# Patient Record
Sex: FEMALE | Race: BLACK OR AFRICAN AMERICAN | NOT HISPANIC OR LATINO | Employment: OTHER | URBAN - METROPOLITAN AREA
[De-identification: names, ages, dates, MRNs, and addresses within clinical notes are randomized per-mention and may not be internally consistent; named-entity substitution may affect disease eponyms.]

---

## 2023-04-07 ENCOUNTER — NURSING HOME VISIT (OUTPATIENT)
Dept: POST ACUTE CARE | Facility: EXTERNAL LOCATION | Age: 78
End: 2023-04-07
Payer: COMMERCIAL

## 2023-04-07 DIAGNOSIS — F02.80 ALZHEIMER'S DEMENTIA, UNSPECIFIED DEMENTIA SEVERITY, UNSPECIFIED TIMING OF DEMENTIA ONSET, UNSPECIFIED WHETHER BEHAVIORAL, PSYCHOTIC, OR MOOD DISTURBANCE OR ANXIETY (MULTI): ICD-10-CM

## 2023-04-07 DIAGNOSIS — K21.9 GASTROESOPHAGEAL REFLUX DISEASE WITHOUT ESOPHAGITIS: ICD-10-CM

## 2023-04-07 DIAGNOSIS — F41.9 ANXIETY AND DEPRESSION: ICD-10-CM

## 2023-04-07 DIAGNOSIS — L30.9 DERMATITIS: Primary | ICD-10-CM

## 2023-04-07 DIAGNOSIS — R53.81 PHYSICAL DEBILITY: ICD-10-CM

## 2023-04-07 DIAGNOSIS — F32.A ANXIETY AND DEPRESSION: ICD-10-CM

## 2023-04-07 DIAGNOSIS — M47.812 CHRONIC NECK PAIN WITH OSTEOARTHRITIS DETERMINED BY X-RAY: ICD-10-CM

## 2023-04-07 DIAGNOSIS — D64.9 ANEMIA, UNSPECIFIED TYPE: ICD-10-CM

## 2023-04-07 DIAGNOSIS — Z86.718 HISTORY OF DEEP VENOUS THROMBOSIS (DVT) OF DISTAL VEIN OF LEFT LOWER EXTREMITY: ICD-10-CM

## 2023-04-07 DIAGNOSIS — G30.9 ALZHEIMER'S DEMENTIA, UNSPECIFIED DEMENTIA SEVERITY, UNSPECIFIED TIMING OF DEMENTIA ONSET, UNSPECIFIED WHETHER BEHAVIORAL, PSYCHOTIC, OR MOOD DISTURBANCE OR ANXIETY (MULTI): ICD-10-CM

## 2023-04-07 DIAGNOSIS — R13.10 DYSPHAGIA, UNSPECIFIED TYPE: ICD-10-CM

## 2023-04-07 DIAGNOSIS — E46 PROTEIN-CALORIE MALNUTRITION, UNSPECIFIED SEVERITY (MULTI): ICD-10-CM

## 2023-04-07 DIAGNOSIS — G89.29 CHRONIC NECK PAIN WITH OSTEOARTHRITIS DETERMINED BY X-RAY: ICD-10-CM

## 2023-04-07 DIAGNOSIS — E78.5 HYPERLIPIDEMIA, UNSPECIFIED HYPERLIPIDEMIA TYPE: ICD-10-CM

## 2023-04-07 DIAGNOSIS — I10 PRIMARY HYPERTENSION: ICD-10-CM

## 2023-04-07 DIAGNOSIS — G62.9 NEUROPATHY: ICD-10-CM

## 2023-04-07 PROCEDURE — 99309 SBSQ NF CARE MODERATE MDM 30: CPT | Performed by: NURSE PRACTITIONER

## 2023-04-07 NOTE — LETTER
Patient: Elida Perry  : 1945    Encounter Date: 2023    Late entry;   Subjective  Patient ID: Elida Perry is a 77 y.o. female who is long term resident being seen and evaluated for multiple medical problems.    HPI   A 78 y/o AA female who is a LTC resident at [facility]. Resident with medical history of HTN, HLD, Senile Dementia with behavioral disturbance, Depression, Anxiety, Bell's palsy, Anemia, Osteoarthritis of B/L shoulders, PVD, and Protein Calorie Malnutrition. Resident calm and pleasant. Care giver reported resident is itching on the back of her back of the head. No acute distress. Tolerate TF well. Denies SOB, dyspnea, F/C/S/N/V, constipation per care giver reported. Unable to participate meaningfully in ROS secondary to dementia.     Past Medical History  · Alzheimer's type dementia (331.0,294.10) (G30.9,F02.80)  · Anemia (285.9) (D64.9)  · Anxiety (300.00) (F41.9)  · Arthritis (716.90) (M19.90)  · Atopic dermatitis (691.8) (L20.9)  · Bell's palsy (351.0) (G51.0)  · Benign essential hypertension (401.1) (I10)  · Bilateral hearing loss (389.9) (H91.93)  · Bilateral shoulder pain, unspecified chronicity (719.41) (M25.511,M25.512)  · Chronic GERD (530.81) (K21.9)  · Chronic pain (338.29) (G89.29)  · Encounter for screening for osteoporosis (V82.81) (Z13.820)  · Esophageal reflux (530.81) (K21.9)  · Fibrocystic breast disease (610.1) (N60.19)  · Hypercholesterolemia (272.0) (E78.00)  · Insomnia (780.52) (G47.00)  · Lower back pain (724.2) (M54.50)  · Osteoarthritis of knee (715.36) (M17.9)  · Osteoarthritis of left shoulder (715.91) (M19.012)  · Osteoarthritis of right shoulder (715.91) (M19.011)  · Osteoporosis (733.00) (M81.0)  · Patellar tendonitis (726.64) (M76.50)  · Protein-calorie malnutrition, mild (263.1) (E44.1)  · PVD (peripheral vascular disease) (443.9) (I73.9)  · Rotator cuff arthropathy (716.81) (M12.819)  · S/P TKR (total knee replacement) using cement, left  (V43.65) (Z96.652)  · Urge and stress incontinence (788.33) (N39.46)  · Urinary frequency (788.41) (R35.0)  Surgical History   · History of Hand Surgery   · History of Hysterectomy   · History of Percutaneous endoscopic gastrostomy tube insertion   · History of Throat Surgery   · History of Tonsillectomy   · History of Total Knee Arthroplasty   · History of Wrist Surgery    Family History  Mother    · Family history of Reported Family History Of Heart Disease  Father    · Family history of Reported Family History Of Heart Disease  Brother    · Family history of Reported Family History Of Cancer  Family History    · Family history of Reported Family History Of Cancer  Social History  Problems    · Caregiver   · Completed elementary school   · Completed high school   ·    · Employed in sales position   · Marital History - Single   · Never Drank Alcohol   · Never smoker   · Never smoker   · Not currently employed   · Occupation: Retired   ·   Allergies  Medication    · No Known Drug Allergies   Recorded By: Ena Roca; 7/10/2013 8:49:49 AM  Review of Systems  Limited of ROS due to dementia   Objective  3/24/2023 14:51 111 / 73 mmHg Sitting r/arm  3/24/2023 14:51 97.3 °F Forehead (non-contact)  3/24/2023 14:51 52 bpm Regular  3/24/2023 14:51 18 Breaths/min  3/24/2023 14:51 98.0 % Room Air  Physical Exam  Constitutional: awake, afebrile, not in acute distress  Eyes: clear sclera  ENMT: mucous membranes moist  Respiratory/Thorax: CTAB, no rales/rhonchi/wheezing  Cardiovascular: RRR, no rubs/murmurs/gallops  Gastrointestinal: soft, nt/nd/ng/nr. PEG in place  Extremities: no edema, no cellulitis  Neurological: Awake and alert at her baseline/dementia   Psychological: Appropriate mood and behavior  Assessment/Plan  #Dermatitis  - Started on steroid cream   #HTN  - c/w Norvasc 5 mg Peg daily as ordered continue to monitor vitals  - continue monitor renal function  #HLD  - c/w Lipitor 40 mg PEG daily as  ordered  - Lipid panel yearly   - monitor CMP every 6 months   # Alzheimer type dementia   - C/W memantine 10 mg oral tablet - 1 tab(s) orally 2 times a day  - C/W donepezil 10 mg oral tablet - 1 tab(s) orally once a day (at bedtime)  #Depression, Anxiety  - c/w sertraline 125 mg oral tablet - 1 tab(s) PEG once a day  - c/w traZODone 50 mg oral tablet - 1.5 tab(s) PEG once a day (at bedtime)  #Protein Calorie Malnutrition.   #Dysphagia: PEG  - c/w Enteral Feed and water flushes for supplement   - c/w Regular diet, Pureed texture, Thin consistency  #Physical debility  - Need assistance  - Fall precaution   #Chronic C-diff: c/w vanco and Probiotic capsule as ordered  #hx LLE DVT: c/w Eliquis as ordered  #Anemia: stable.   - c/w ferrous sulfate and Folic Acid as ordered. continue to monitor labs  #Neuropathy/Osteoarthritis Pain: c/w Gabapentin and Tramadol as ordered  #Diffuse Obstructive Pulmonary Syndrome: stable. c/w ProAir, Singulair, and Claritin as ordered  #GERD: c/w Protonix   #Constipation: c/w Dulcolax and Mag Hydroxide as ordered      Electronically Signed By: YOSSI Barney-CNP   5/7/23 12:01 AM

## 2023-05-06 PROBLEM — G89.29 CHRONIC NECK PAIN WITH OSTEOARTHRITIS DETERMINED BY X-RAY: Status: ACTIVE | Noted: 2023-05-06

## 2023-05-06 PROBLEM — R13.10 DYSPHAGIA: Status: ACTIVE | Noted: 2023-05-06

## 2023-05-06 PROBLEM — G62.9 NEUROPATHY: Status: ACTIVE | Noted: 2023-05-06

## 2023-05-06 PROBLEM — M47.812 CHRONIC NECK PAIN WITH OSTEOARTHRITIS DETERMINED BY X-RAY: Status: ACTIVE | Noted: 2023-05-06

## 2023-05-06 PROBLEM — E78.5 HYPERLIPIDEMIA: Status: ACTIVE | Noted: 2023-05-06

## 2023-05-06 PROBLEM — E46 PROTEIN-CALORIE MALNUTRITION (MULTI): Status: ACTIVE | Noted: 2023-05-06

## 2023-05-06 PROBLEM — D64.9 ANEMIA: Status: ACTIVE | Noted: 2023-05-06

## 2023-05-06 PROBLEM — G30.9 ALZHEIMER'S DEMENTIA (MULTI): Status: ACTIVE | Noted: 2023-05-06

## 2023-05-06 PROBLEM — I10 PRIMARY HYPERTENSION: Status: ACTIVE | Noted: 2023-05-06

## 2023-05-06 PROBLEM — F32.A ANXIETY AND DEPRESSION: Status: ACTIVE | Noted: 2023-05-06

## 2023-05-06 PROBLEM — F02.80 ALZHEIMER'S DEMENTIA (MULTI): Status: ACTIVE | Noted: 2023-05-06

## 2023-05-06 PROBLEM — F41.9 ANXIETY AND DEPRESSION: Status: ACTIVE | Noted: 2023-05-06

## 2023-05-06 PROBLEM — Z86.718 HISTORY OF DEEP VENOUS THROMBOSIS (DVT) OF DISTAL VEIN OF LEFT LOWER EXTREMITY: Status: ACTIVE | Noted: 2023-05-06

## 2023-05-06 PROBLEM — R53.81 PHYSICAL DEBILITY: Status: ACTIVE | Noted: 2023-05-06

## 2023-05-06 PROBLEM — L30.9 DERMATITIS: Status: ACTIVE | Noted: 2023-05-06

## 2023-05-07 NOTE — PROGRESS NOTES
Subjective   Patient ID: Elida Perry is a 77 y.o. female who is long term resident being seen and evaluated for multiple medical problems. Monthly visit   HPI   A 76 y/o AA female who is a LTC resident at [facility]. Resident with medical history of HTN, HLD, Senile Dementia with behavioral disturbance, Depression, Anxiety, Bell's palsy, Anemia, Osteoarthritis of B/L shoulders, PVD, and Protein Calorie Malnutrition. Resident calm and pleasant. No acute distress. Tolerate TF well. Denies SOB, dyspnea, F/C/S/N/V, constipation per care giver reported. Unable to participate meaningfully in ROS secondary to dementia.   Review of Systems  Limited of ROS due to dementia   Objective     Lab result 5/4/23  Hemoglobin A1c 5.5 % 4.3-5.6  Final  Comp Metabolic Panel       Protein, Total  6.5 g/dL 6.3-8.0  Final           Albumin  3.1 g/dL 3.9-4.9 L Final           Calcium, Total  9.2 mg/dL 8.5-10.2  Final           Bilirubin, Total  0.2 mg/dL 0.2-1.3  Final           Alkaline Phosphatase  333 U/L  H Final           AST  27 U/L 13-35  Final           ALT  22 U/L 7-38  Final           Glucose  87 mg/dL 74-99  Final      The American Diabetes Association (ADA) provides guidance for cutoff values for fasting glucose and random glucose. The ADA defines fasting as no caloric intake for at least 8 hours. Fasting plasma glucose results between 100 to 125 mg/dL indicate increased risk for diabetes (prediabetes).  Fasting plasma glucose results greater than or equal to 126 mg/dL meet the criteria for diagnosis of diabetes. In the absence of unequivocal hyperglycemia, results should be confirmed by repeat testing. In a patient with classic symptoms of hyperglycemia or hyperglycemic crisis, random plasma glucose results greater than or equal to 200 mg/dL meet the criteria for diagnosis of diabetes.  Reference: Standards of Medical Care in Diabetes 2016, American Diabetes Association. Diabetes Care. 2016.39(Suppl 1).        BUN  10 mg/dL 7-21  Final           Creatinine  0.72 mg/dL 0.58-0.96  Final           Sodium  138 mmol/L 136-144  Final           Potassium  3.6 mmol/L 3.7-5.1 L Final           Chloride  104 mmol/L   Final           CO2  28 mmol/L 22-30  Final           Anion Gap  6 mmol/L 9-18 L Final           Estimated Glomerular Filtration Rate  87 mL/min/1.73 meters squared >=60  Final    CBC and Differential       White Blood Cell Count  5.18 k/uL 3.70-11.00  Final           RBC  3.56 m/uL 3.90-5.20 L Final           Hemoglobin  10.3 g/dL 11.5-15.5 L Final           Hematocrit  32.0 % 36.0-46.0 L Final           MCV  89.9 fL 80.0-100.0  Final           MCH  28.9 pg 26.0-34.0  Final           MCHC  32.2 g/dL 30.5-36.0  Final           RDW-CV  13.4 % 11.5-15.0  Final           Platelet Count  131 k/uL 150-400 L Final      Results checked and verified.No clot detected.       MPV  12.0 fL 9.0-12.7  Final           Neut%  48.3 %   Final           Abs Neut  2.50 k/uL 1.45-7.50  Final           Lymph%  36.9 %   Final           Abs Lymph  1.91 k/uL 1.00-4.00  Final           Mono%  10.2 %   Final           Abs Mono  0.53 k/uL <0.87  Final           Eosin%  4.2 %   Final           Abs Eosin  0.22 k/uL <0.46  Final           Baso%  0.2 %   Final           Abs Baso  <0.03 k/uL <0.11  Final           Immature Gran %%  0.2 %   Final           Abs Immature Gran  <0.03 k/uL <0.10  Final           Absolute nRBC  0.0 /100 WBC   Final           Absolute nRBC  <0.01 k/uL <0.01  Final           Diff Type  Auto    Final      This is an appended report.  These results have been appended to a previously  verified report.   Physical Exam  Constitutional: awake, afebrile, not in acute distress  Eyes: clear sclera  ENMT: mucous membranes moist  Respiratory/Thorax: CTAB, no rales/rhonchi/wheezing  Cardiovascular: RRR, no rubs/murmurs/gallops  Gastrointestinal: soft, nt/nd/ng/nr. PEG in place  Extremities: no edema, no  cellulitis  Neurological: Awake and alert at her baseline/dementia   Psychological: Appropriate mood and behavior  Assessment/Plan   #Anemia: stable.   - c/w ferrous sulfate and Folic Acid as ordered. continue to monitor labs  #HTN  - c/w Norvasc 5 mg Peg daily as ordered continue to monitor vitals  - continue monitor renal function  #HLD  - c/w Lipitor 40 mg PEG daily as ordered  - Lipid panel yearly on 12/13/2023  - monitor CMP every 6 months   # Alzheimer type dementia   - C/W memantine 10 mg oral tablet - 1 tab(s) orally 2 times a day  - C/W donepezil 10 mg oral tablet - 1 tab(s) orally once a day (at bedtime)    Chronic medical problem   #Depression, Anxiety  - c/w sertraline 125 mg oral tablet - 1 tab(s) PEG once a day  - c/w traZODone 50 mg oral tablet - 1.5 tab(s) PEG once a day (at bedtime)  #Protein Calorie Malnutrition.   #Dysphagia: PEG  - c/w Enteral Feed and water flushes for supplement   - c/w Regular diet, Pureed texture, Thin consistency  #Physical debility  - Need assistance  - Fall precaution   #Chronic C-diff: c/w vanco and Probiotic capsule as ordered  #hx LLE DVT: c/w Eliquis as ordered  #Neuropathy/Osteoarthritis Pain: c/w Gabapentin and Tramadol as ordered  #Diffuse Obstructive Pulmonary Syndrome: stable. c/w ProAir, Singulair, and Claritin as ordered  #GERD: c/w Protonix   #Constipation: c/w Dulcolax and Mag Hydroxide as ordered    Time spending for 35 minutes   -reviewed of hospital record via EMR and reconciled medication in PCC and EMR (d/c summary). Reviewed orders, medications, records, and pertinent labs/x-rays from PCC. Reviewed VS, BS, O2, etc as per protocol. Reviewed and signed off orders, medications, Labs, x-rays, and current diagnoses in PCC  -Obtained history  -Performing physical examination  -Ordering medications, lab   -Documenting clinical information in the [facility]   -Care coordinating with nursing staff

## 2023-05-07 NOTE — PROGRESS NOTES
Late entry;   Subjective   Patient ID: Elida Perry is a 77 y.o. female who is long term resident being seen and evaluated for multiple medical problems.    HPI   A 78 y/o AA female who is a LTC resident at [facility]. Resident with medical history of HTN, HLD, Senile Dementia with behavioral disturbance, Depression, Anxiety, Bell's palsy, Anemia, Osteoarthritis of B/L shoulders, PVD, and Protein Calorie Malnutrition. Resident calm and pleasant. Care giver reported resident is itching on the back of her back of the head. No acute distress. Tolerate TF well. Denies SOB, dyspnea, F/C/S/N/V, constipation per care giver reported. Unable to participate meaningfully in ROS secondary to dementia.     Past Medical History  · Alzheimer's type dementia (331.0,294.10) (G30.9,F02.80)  · Anemia (285.9) (D64.9)  · Anxiety (300.00) (F41.9)  · Arthritis (716.90) (M19.90)  · Atopic dermatitis (691.8) (L20.9)  · Bell's palsy (351.0) (G51.0)  · Benign essential hypertension (401.1) (I10)  · Bilateral hearing loss (389.9) (H91.93)  · Bilateral shoulder pain, unspecified chronicity (719.41) (M25.511,M25.512)  · Chronic GERD (530.81) (K21.9)  · Chronic pain (338.29) (G89.29)  · Encounter for screening for osteoporosis (V82.81) (Z13.820)  · Esophageal reflux (530.81) (K21.9)  · Fibrocystic breast disease (610.1) (N60.19)  · Hypercholesterolemia (272.0) (E78.00)  · Insomnia (780.52) (G47.00)  · Lower back pain (724.2) (M54.50)  · Osteoarthritis of knee (715.36) (M17.9)  · Osteoarthritis of left shoulder (715.91) (M19.012)  · Osteoarthritis of right shoulder (715.91) (M19.011)  · Osteoporosis (733.00) (M81.0)  · Patellar tendonitis (726.64) (M76.50)  · Protein-calorie malnutrition, mild (263.1) (E44.1)  · PVD (peripheral vascular disease) (443.9) (I73.9)  · Rotator cuff arthropathy (716.81) (M12.819)  · S/P TKR (total knee replacement) using cement, left (V43.65) (Z96.652)  · Urge and stress incontinence (788.33) (N39.46)  ·  Urinary frequency (788.41) (R35.0)  Surgical History   · History of Hand Surgery   · History of Hysterectomy   · History of Percutaneous endoscopic gastrostomy tube insertion   · History of Throat Surgery   · History of Tonsillectomy   · History of Total Knee Arthroplasty   · History of Wrist Surgery    Family History  Mother    · Family history of Reported Family History Of Heart Disease  Father    · Family history of Reported Family History Of Heart Disease  Brother    · Family history of Reported Family History Of Cancer  Family History    · Family history of Reported Family History Of Cancer  Social History  Problems    · Caregiver   · Completed elementary school   · Completed high school   ·    · Employed in sales position   · Marital History - Single   · Never Drank Alcohol   · Never smoker   · Never smoker   · Not currently employed   · Occupation: Retired   ·   Allergies  Medication    · No Known Drug Allergies   Recorded By: Ena Roca; 7/10/2013 8:49:49 AM  Review of Systems  Limited of ROS due to dementia   Objective   3/24/2023 14:51 111 / 73 mmHg Sitting r/arm  3/24/2023 14:51 97.3 °F Forehead (non-contact)  3/24/2023 14:51 52 bpm Regular  3/24/2023 14:51 18 Breaths/min  3/24/2023 14:51 98.0 % Room Air  Physical Exam  Constitutional: awake, afebrile, not in acute distress  Eyes: clear sclera  ENMT: mucous membranes moist  Respiratory/Thorax: CTAB, no rales/rhonchi/wheezing  Cardiovascular: RRR, no rubs/murmurs/gallops  Gastrointestinal: soft, nt/nd/ng/nr. PEG in place  Extremities: no edema, no cellulitis  Neurological: Awake and alert at her baseline/dementia   Psychological: Appropriate mood and behavior  Assessment/Plan   #Dermatitis  - Started on steroid cream   #HTN  - c/w Norvasc 5 mg Peg daily as ordered continue to monitor vitals  - continue monitor renal function  #HLD  - c/w Lipitor 40 mg PEG daily as ordered  - Lipid panel yearly   - monitor CMP every 6 months   # Skip  type dementia   - C/W memantine 10 mg oral tablet - 1 tab(s) orally 2 times a day  - C/W donepezil 10 mg oral tablet - 1 tab(s) orally once a day (at bedtime)  #Depression, Anxiety  - c/w sertraline 125 mg oral tablet - 1 tab(s) PEG once a day  - c/w traZODone 50 mg oral tablet - 1.5 tab(s) PEG once a day (at bedtime)  #Protein Calorie Malnutrition.   #Dysphagia: PEG  - c/w Enteral Feed and water flushes for supplement   - c/w Regular diet, Pureed texture, Thin consistency  #Physical debility  - Need assistance  - Fall precaution   #Chronic C-diff: c/w vanco and Probiotic capsule as ordered  #hx LLE DVT: c/w Eliquis as ordered  #Anemia: stable.   - c/w ferrous sulfate and Folic Acid as ordered. continue to monitor labs  #Neuropathy/Osteoarthritis Pain: c/w Gabapentin and Tramadol as ordered  #Diffuse Obstructive Pulmonary Syndrome: stable. c/w ProAir, Singulair, and Claritin as ordered  #GERD: c/w Protonix   #Constipation: c/w Dulcolax and Mag Hydroxide as ordered

## 2023-05-08 ENCOUNTER — NURSING HOME VISIT (OUTPATIENT)
Dept: POST ACUTE CARE | Facility: EXTERNAL LOCATION | Age: 78
End: 2023-05-08
Payer: COMMERCIAL

## 2023-05-08 DIAGNOSIS — D64.9 ANEMIA, UNSPECIFIED TYPE: Primary | ICD-10-CM

## 2023-05-08 DIAGNOSIS — E78.5 HYPERLIPIDEMIA, UNSPECIFIED HYPERLIPIDEMIA TYPE: ICD-10-CM

## 2023-05-08 DIAGNOSIS — G30.9 AD (ALZHEIMER'S DISEASE) (MULTI): ICD-10-CM

## 2023-05-08 DIAGNOSIS — F02.80 AD (ALZHEIMER'S DISEASE) (MULTI): ICD-10-CM

## 2023-05-08 DIAGNOSIS — I10 PRIMARY HYPERTENSION: ICD-10-CM

## 2023-05-08 PROCEDURE — 99309 SBSQ NF CARE MODERATE MDM 30: CPT | Performed by: NURSE PRACTITIONER

## 2023-05-08 NOTE — LETTER
Patient: Elida Perry  : 1945    Encounter Date: 2023    Subjective   Patient ID: Elida Perry is a 77 y.o. female who is long term resident being seen and evaluated for multiple medical problems. Monthly visit   HPI   A 76 y/o AA female who is a LTC resident at [facility]. Resident with medical history of HTN, HLD, Senile Dementia with behavioral disturbance, Depression, Anxiety, Bell's palsy, Anemia, Osteoarthritis of B/L shoulders, PVD, and Protein Calorie Malnutrition. Resident calm and pleasant. No acute distress. Tolerate TF well. Denies SOB, dyspnea, F/C/S/N/V, constipation per care giver reported. Unable to participate meaningfully in ROS secondary to dementia.   Review of Systems  Limited of ROS due to dementia   Objective     Lab result 23  Hemoglobin A1c 5.5 % 4.3-5.6  Final  Comp Metabolic Panel       Protein, Total  6.5 g/dL 6.3-8.0  Final           Albumin  3.1 g/dL 3.9-4.9 L Final           Calcium, Total  9.2 mg/dL 8.5-10.2  Final           Bilirubin, Total  0.2 mg/dL 0.2-1.3  Final           Alkaline Phosphatase  333 U/L  H Final           AST  27 U/L 13-35  Final           ALT  22 U/L 7-38  Final           Glucose  87 mg/dL 74-99  Final      The American Diabetes Association (ADA) provides guidance for cutoff values for fasting glucose and random glucose. The ADA defines fasting as no caloric intake for at least 8 hours. Fasting plasma glucose results between 100 to 125 mg/dL indicate increased risk for diabetes (prediabetes).  Fasting plasma glucose results greater than or equal to 126 mg/dL meet the criteria for diagnosis of diabetes. In the absence of unequivocal hyperglycemia, results should be confirmed by repeat testing. In a patient with classic symptoms of hyperglycemia or hyperglycemic crisis, random plasma glucose results greater than or equal to 200 mg/dL meet the criteria for diagnosis of diabetes.  Reference: Standards of Medical Care in Diabetes  2016, American Diabetes Association. Diabetes Care. 2016.39(Suppl 1).       BUN  10 mg/dL 7-21  Final           Creatinine  0.72 mg/dL 0.58-0.96  Final           Sodium  138 mmol/L 136-144  Final           Potassium  3.6 mmol/L 3.7-5.1 L Final           Chloride  104 mmol/L   Final           CO2  28 mmol/L 22-30  Final           Anion Gap  6 mmol/L 9-18 L Final           Estimated Glomerular Filtration Rate  87 mL/min/1.73 meters squared >=60  Final    CBC and Differential       White Blood Cell Count  5.18 k/uL 3.70-11.00  Final           RBC  3.56 m/uL 3.90-5.20 L Final           Hemoglobin  10.3 g/dL 11.5-15.5 L Final           Hematocrit  32.0 % 36.0-46.0 L Final           MCV  89.9 fL 80.0-100.0  Final           MCH  28.9 pg 26.0-34.0  Final           MCHC  32.2 g/dL 30.5-36.0  Final           RDW-CV  13.4 % 11.5-15.0  Final           Platelet Count  131 k/uL 150-400 L Final      Results checked and verified.No clot detected.       MPV  12.0 fL 9.0-12.7  Final           Neut%  48.3 %   Final           Abs Neut  2.50 k/uL 1.45-7.50  Final           Lymph%  36.9 %   Final           Abs Lymph  1.91 k/uL 1.00-4.00  Final           Mono%  10.2 %   Final           Abs Mono  0.53 k/uL <0.87  Final           Eosin%  4.2 %   Final           Abs Eosin  0.22 k/uL <0.46  Final           Baso%  0.2 %   Final           Abs Baso  <0.03 k/uL <0.11  Final           Immature Gran %%  0.2 %   Final           Abs Immature Gran  <0.03 k/uL <0.10  Final           Absolute nRBC  0.0 /100 WBC   Final           Absolute nRBC  <0.01 k/uL <0.01  Final           Diff Type  Auto    Final      This is an appended report.  These results have been appended to a previously  verified report.   Physical Exam  Constitutional: awake, afebrile, not in acute distress  Eyes: clear sclera  ENMT: mucous membranes moist  Respiratory/Thorax: CTAB, no rales/rhonchi/wheezing  Cardiovascular: RRR, no rubs/murmurs/gallops  Gastrointestinal: soft,  nt/nd/ng/nr. PEG in place  Extremities: no edema, no cellulitis  Neurological: Awake and alert at her baseline/dementia   Psychological: Appropriate mood and behavior  Assessment/Plan   #Anemia: stable.   - c/w ferrous sulfate and Folic Acid as ordered. continue to monitor labs  #HTN  - c/w Norvasc 5 mg Peg daily as ordered continue to monitor vitals  - continue monitor renal function  #HLD  - c/w Lipitor 40 mg PEG daily as ordered  - Lipid panel yearly on 12/13/2023  - monitor CMP every 6 months   # Alzheimer type dementia   - C/W memantine 10 mg oral tablet - 1 tab(s) orally 2 times a day  - C/W donepezil 10 mg oral tablet - 1 tab(s) orally once a day (at bedtime)    Chronic medical problem   #Depression, Anxiety  - c/w sertraline 125 mg oral tablet - 1 tab(s) PEG once a day  - c/w traZODone 50 mg oral tablet - 1.5 tab(s) PEG once a day (at bedtime)  #Protein Calorie Malnutrition.   #Dysphagia: PEG  - c/w Enteral Feed and water flushes for supplement   - c/w Regular diet, Pureed texture, Thin consistency  #Physical debility  - Need assistance  - Fall precaution   #Chronic C-diff: c/w vanco and Probiotic capsule as ordered  #hx LLE DVT: c/w Eliquis as ordered  #Neuropathy/Osteoarthritis Pain: c/w Gabapentin and Tramadol as ordered  #Diffuse Obstructive Pulmonary Syndrome: stable. c/w ProAir, Singulair, and Claritin as ordered  #GERD: c/w Protonix   #Constipation: c/w Dulcolax and Mag Hydroxide as ordered    Time spending for 35 minutes   -reviewed of hospital record via EMR and reconciled medication in PCC and EMR (d/c summary). Reviewed orders, medications, records, and pertinent labs/x-rays from PCC. Reviewed VS, BS, O2, etc as per protocol. Reviewed and signed off orders, medications, Labs, x-rays, and current diagnoses in PCC  -Obtained history  -Performing physical examination  -Ordering medications, lab   -Documenting clinical information in the [facility]   -Care coordinating with nursing  staff      Electronically Signed By: MATILAD Barney   5/17/23 11:24 PM

## 2024-04-24 ENCOUNTER — NURSING HOME VISIT (OUTPATIENT)
Dept: POST ACUTE CARE | Facility: EXTERNAL LOCATION | Age: 79
End: 2024-04-24
Payer: COMMERCIAL

## 2024-04-24 DIAGNOSIS — F02.80 ALZHEIMER'S DEMENTIA WITHOUT BEHAVIORAL DISTURBANCE, PSYCHOTIC DISTURBANCE, MOOD DISTURBANCE, OR ANXIETY, UNSPECIFIED DEMENTIA SEVERITY, UNSPECIFIED TIMING OF DEMENTIA ONSET (MULTI): Primary | ICD-10-CM

## 2024-04-24 DIAGNOSIS — G62.9 NEUROPATHY: ICD-10-CM

## 2024-04-24 DIAGNOSIS — Z86.718 HISTORY OF DEEP VENOUS THROMBOSIS (DVT) OF DISTAL VEIN OF LEFT LOWER EXTREMITY: ICD-10-CM

## 2024-04-24 DIAGNOSIS — F41.9 ANXIETY AND DEPRESSION: ICD-10-CM

## 2024-04-24 DIAGNOSIS — R13.12 OROPHARYNGEAL DYSPHAGIA: ICD-10-CM

## 2024-04-24 DIAGNOSIS — M47.812 CHRONIC NECK PAIN WITH OSTEOARTHRITIS DETERMINED BY X-RAY: ICD-10-CM

## 2024-04-24 DIAGNOSIS — E44.0 MODERATE PROTEIN-CALORIE MALNUTRITION (MULTI): ICD-10-CM

## 2024-04-24 DIAGNOSIS — R53.81 PHYSICAL DEBILITY: ICD-10-CM

## 2024-04-24 DIAGNOSIS — L30.9 DERMATITIS: ICD-10-CM

## 2024-04-24 DIAGNOSIS — I10 PRIMARY HYPERTENSION: ICD-10-CM

## 2024-04-24 DIAGNOSIS — G89.29 CHRONIC NECK PAIN WITH OSTEOARTHRITIS DETERMINED BY X-RAY: ICD-10-CM

## 2024-04-24 DIAGNOSIS — G30.9 ALZHEIMER'S DEMENTIA WITHOUT BEHAVIORAL DISTURBANCE, PSYCHOTIC DISTURBANCE, MOOD DISTURBANCE, OR ANXIETY, UNSPECIFIED DEMENTIA SEVERITY, UNSPECIFIED TIMING OF DEMENTIA ONSET (MULTI): Primary | ICD-10-CM

## 2024-04-24 DIAGNOSIS — F32.A ANXIETY AND DEPRESSION: ICD-10-CM

## 2024-04-24 DIAGNOSIS — D64.9 ANEMIA, UNSPECIFIED TYPE: ICD-10-CM

## 2024-04-24 PROCEDURE — 99309 SBSQ NF CARE MODERATE MDM 30: CPT | Performed by: FAMILY MEDICINE

## 2024-05-08 NOTE — PROGRESS NOTES
Subjective   Patient ID: Elida Perry is a 77 y.o. female who is long term resident being seen and evaluated for multiple medical problems.   MONALISA don is a 77 year old female LTC resident Patient is a DNR CCA Signed paperwork in chart. On exam patient resting in bed. Patient Alert 1-2 Patient denies A/V hallucinations. Patient voices no complaints, denies pain or discomfort. Appetite fair at baseline. No acute medical needs   /72 P 80 RR 16    Physical Exam  Constitutional: awake, afebrile, not in acute distress  Eyes: clear sclera  ENMT: mucous membranes moist  Respiratory/Thorax: CTAB, no rales/rhonchi/wheezing  Cardiovascular: RRR, no rubs/murmurs/gallops  Gastrointestinal: soft, nt/nd/ng/nr. PEG in place  Extremities: no edema, no cellulitis  Neurological: Awake and alert at her baseline/dementia   Psychological: calm .     Assessment/Plan   Problem List Items Addressed This Visit             ICD-10-CM    Dermatitis L30.9    Primary hypertension I10    Alzheimer's dementia (Multi) - Primary G30.9, F02.80    Anxiety and depression F41.9, F32.A    Protein-calorie malnutrition (Multi) E46    Dysphagia R13.10    Physical debility R53.81    History of deep venous thrombosis (DVT) of distal vein of left lower extremity Z86.718    Anemia D64.9    Neuropathy G62.9    Chronic neck pain with osteoarthritis determined by x-ray M47.812, G89.29       #Chronic Pain Management: (G89.4)/ Osteoarthritis (715.90) (M19.90)  -Patient Reports pain is poorly controlled  -Nurse to offer non-pharmacological pain control interventions prior to administering as needed pain medication ie.. (1) Change in position (2) rest (3) deep breathing exercises (4) back rub (5) diversion/distraction (6) other  -c/w Tylenol, Lidocaine  #Anxiety (f41.1) Depression F32.3) Patient denies worsening Depression or anxiety RN reports no acute behavioral disturbances  -I will c/w Behavior monitoring for the following: mood changes;  anxious/agitated; angry; striking out/hitting  -Behavior monitoring for the following (1) mood changes (2) depressed mood (3) anxiety/agitation (4) hallucinations/delusions  - F/U with Psych c/w Medication treatment plan  -c/w Zoloft  # Dementia with behavioral disturbance, unspecified dementia type (294.21) (F03.91)  RN reports no acute behavioral disturbances Patient denies A/V hallucinations  -Behavior monitoring for the following (1) mood changes (2) depressed mood (3) anxiety/agitation (4) hallucinations/delusions  -F/U with Psych  -C/w Medication treatment Plan  -c/w Namenda, Risperidone  #HLD (hyperlipidemia) (272.4) (E78.5)  -Monitor Lipid Panel Q six months  -c/w Atorvastatin#Weakness generalized (780.79) (R53.1)  Patient has worsening physical deconditioning  -PT OT ST PRN  -Restorative Care PRN  - Monitor Labs  -Monitor Nutritional Status  LLE DVT ( I 82.409)  Continue with Eliquis  #Constipation (K59.00)  -RN reportspatient had Bowel movement this AM. Patient denies abdominal discomfort.  - I ordered RN to report No Bowel movementgreater than 3 days.  -c/w Colace Senna Dulcolax Suppository Fleet Enema PRNTime spending for 35 minutes   -reviewed of hospital record via EMR and reconciled medication in PCC and EMR (d/c summary). Reviewed orders, medications, records, and pertinent labs/x-rays from PCC. Reviewed VS, BS, O2, etc as per protocol. Reviewed and signed off orders, medications, Labs, x-rays, and current diagnoses in PCC  -Obtained history  -Performing physical examination  -Ordering medications, lab   -Documenting clinical information in the [facility]   -Care coordinating with nursing staff

## 2024-05-15 ENCOUNTER — NURSING HOME VISIT (OUTPATIENT)
Dept: POST ACUTE CARE | Facility: EXTERNAL LOCATION | Age: 79
End: 2024-05-15
Payer: COMMERCIAL

## 2024-05-15 DIAGNOSIS — F02.80 ALZHEIMER'S DEMENTIA WITHOUT BEHAVIORAL DISTURBANCE, PSYCHOTIC DISTURBANCE, MOOD DISTURBANCE, OR ANXIETY, UNSPECIFIED DEMENTIA SEVERITY, UNSPECIFIED TIMING OF DEMENTIA ONSET (MULTI): Primary | ICD-10-CM

## 2024-05-15 DIAGNOSIS — G30.9 ALZHEIMER'S DEMENTIA WITHOUT BEHAVIORAL DISTURBANCE, PSYCHOTIC DISTURBANCE, MOOD DISTURBANCE, OR ANXIETY, UNSPECIFIED DEMENTIA SEVERITY, UNSPECIFIED TIMING OF DEMENTIA ONSET (MULTI): Primary | ICD-10-CM

## 2024-05-15 DIAGNOSIS — E78.00 PURE HYPERCHOLESTEROLEMIA: ICD-10-CM

## 2024-05-15 DIAGNOSIS — G62.9 NEUROPATHY: ICD-10-CM

## 2024-05-15 DIAGNOSIS — R13.12 OROPHARYNGEAL DYSPHAGIA: ICD-10-CM

## 2024-05-15 DIAGNOSIS — G89.29 CHRONIC NECK PAIN WITH OSTEOARTHRITIS DETERMINED BY X-RAY: ICD-10-CM

## 2024-05-15 DIAGNOSIS — M47.812 CHRONIC NECK PAIN WITH OSTEOARTHRITIS DETERMINED BY X-RAY: ICD-10-CM

## 2024-05-15 DIAGNOSIS — F41.9 ANXIETY AND DEPRESSION: ICD-10-CM

## 2024-05-15 DIAGNOSIS — D64.9 ANEMIA, UNSPECIFIED TYPE: ICD-10-CM

## 2024-05-15 DIAGNOSIS — R53.81 PHYSICAL DEBILITY: ICD-10-CM

## 2024-05-15 DIAGNOSIS — I10 PRIMARY HYPERTENSION: ICD-10-CM

## 2024-05-15 DIAGNOSIS — E44.0 MODERATE PROTEIN-CALORIE MALNUTRITION (MULTI): ICD-10-CM

## 2024-05-15 DIAGNOSIS — F32.A ANXIETY AND DEPRESSION: ICD-10-CM

## 2024-05-15 DIAGNOSIS — L30.9 DERMATITIS: ICD-10-CM

## 2024-05-15 DIAGNOSIS — Z86.718 HISTORY OF DEEP VENOUS THROMBOSIS (DVT) OF DISTAL VEIN OF LEFT LOWER EXTREMITY: ICD-10-CM

## 2024-05-15 PROCEDURE — 99309 SBSQ NF CARE MODERATE MDM 30: CPT | Performed by: FAMILY MEDICINE

## 2024-05-15 NOTE — LETTER
Patient: Elida Perry  : 1945    Encounter Date: 05/15/2024    Subjective   Patient ID: Elida Perry is a 77 y.o. female who is long term resident being seen and evaluated for multiple medical problems.   MONALISA don is a 77 year old female LTC resident Patient is a DNR CCA Signed paperwork in chart. On exam patient resting in bed. Patient Alert 1-2 Patient denies A/V hallucinations. Patient voices no complaints, denies pain or discomfort. Appetite fair at baseline. No acute medical needs   /72 P 80 RR 16    Physical Exam  Constitutional: awake, afebrile, not in acute distress  Eyes: clear sclera  ENMT: mucous membranes moist  Respiratory/Thorax: CTAB, no rales/rhonchi/wheezing  Cardiovascular: RRR, no rubs/murmurs/gallops  Gastrointestinal: soft, nt/nd/ng/nr. PEG in place  Extremities: no edema, no cellulitis  Neurological: Awake and alert at her baseline/dementia   Psychological: calm .     Assessment/Plan   Problem List Items Addressed This Visit             ICD-10-CM    Dermatitis L30.9    Primary hypertension I10    Hyperlipidemia E78.5    Alzheimer's dementia (Multi) - Primary G30.9, F02.80    Anxiety and depression F41.9, F32.A    Protein-calorie malnutrition (Multi) E46    Dysphagia R13.10    Physical debility R53.81    History of deep venous thrombosis (DVT) of distal vein of left lower extremity Z86.718    Anemia D64.9    Neuropathy G62.9    Chronic neck pain with osteoarthritis determined by x-ray M47.812, G89.29         #Chronic Pain Management: (G89.4)/ Osteoarthritis (715.90) (M19.90)  -Patient Reports pain is poorly controlled  -Nurse to offer non-pharmacological pain control interventions prior to administering as needed pain medication ie.. (1) Change in position (2) rest (3) deep breathing exercises (4) back rub (5) diversion/distraction (6) other  -c/w Tylenol, Lidocaine  #Anxiety (f41.1) Depression F32.3) Patient denies worsening Depression or anxiety RN reports no acute  behavioral disturbances  -I will c/w Behavior monitoring for the following: mood changes; anxious/agitated; angry; striking out/hitting  -Behavior monitoring for the following (1) mood changes (2) depressed mood (3) anxiety/agitation (4) hallucinations/delusions  - F/U with Psych c/w Medication treatment plan  -c/w Zoloft  # Dementia with behavioral disturbance, unspecified dementia type (294.21) (F03.91)  RN reports no acute behavioral disturbances Patient denies A/V hallucinations  -Behavior monitoring for the following (1) mood changes (2) depressed mood (3) anxiety/agitation (4) hallucinations/delusions  -F/U with Psych  -C/w Medication treatment Plan  -c/w Namenda, Risperidone  #HLD (hyperlipidemia) (272.4) (E78.5)  -Monitor Lipid Panel Q six months  -c/w Atorvastatin#Weakness generalized (780.79) (R53.1)  Patient has worsening physical deconditioning  -PT OT ST PRN  -Restorative Care PRN  - Monitor Labs  -Monitor Nutritional Status  LLE DVT ( I 82.409)  Continue with Eliquis  #Constipation (K59.00)  -RN reportspatient had Bowel movement this AM. Patient denies abdominal discomfort.  - I ordered RN to report No Bowel movementgreater than 3 days.  -c/w Colace Senna Dulcolax Suppository Fleet Enema PRNTime spending for 35 minutes   -reviewed of hospital record via EMR and reconciled medication in PCC and EMR (d/c summary). Reviewed orders, medications, records, and pertinent labs/x-rays from PCC. Reviewed VS, BS, O2, etc as per protocol. Reviewed and signed off orders, medications, Labs, x-rays, and current diagnoses in PCC  -Obtained history  -Performing physical examination  -Ordering medications, lab   -Documenting clinical information in the [facility]   -Care coordinating with nursing staff      Electronically Signed By: Fabián Gonzalez MD   5/27/24  3:09 PM

## 2024-05-27 DIAGNOSIS — R13.12 OROPHARYNGEAL DYSPHAGIA: ICD-10-CM

## 2024-05-27 DIAGNOSIS — R53.81 PHYSICAL DEBILITY: ICD-10-CM

## 2024-05-27 DIAGNOSIS — I10 PRIMARY HYPERTENSION: ICD-10-CM

## 2024-05-27 DIAGNOSIS — G62.9 NEUROPATHY: ICD-10-CM

## 2024-05-27 DIAGNOSIS — F32.A ANXIETY AND DEPRESSION: ICD-10-CM

## 2024-05-27 DIAGNOSIS — Z86.718 HISTORY OF DEEP VENOUS THROMBOSIS (DVT) OF DISTAL VEIN OF LEFT LOWER EXTREMITY: ICD-10-CM

## 2024-05-27 DIAGNOSIS — F41.9 ANXIETY AND DEPRESSION: ICD-10-CM

## 2024-05-27 DIAGNOSIS — E44.0 MODERATE PROTEIN-CALORIE MALNUTRITION (MULTI): ICD-10-CM

## 2024-05-27 DIAGNOSIS — E78.00 PURE HYPERCHOLESTEROLEMIA: ICD-10-CM

## 2024-05-27 DIAGNOSIS — L30.9 DERMATITIS: ICD-10-CM

## 2024-05-27 DIAGNOSIS — F02.80 ALZHEIMER'S DEMENTIA WITHOUT BEHAVIORAL DISTURBANCE, PSYCHOTIC DISTURBANCE, MOOD DISTURBANCE, OR ANXIETY, UNSPECIFIED DEMENTIA SEVERITY, UNSPECIFIED TIMING OF DEMENTIA ONSET (MULTI): Primary | ICD-10-CM

## 2024-05-27 DIAGNOSIS — D64.9 ANEMIA, UNSPECIFIED TYPE: ICD-10-CM

## 2024-05-27 DIAGNOSIS — G30.9 ALZHEIMER'S DEMENTIA WITHOUT BEHAVIORAL DISTURBANCE, PSYCHOTIC DISTURBANCE, MOOD DISTURBANCE, OR ANXIETY, UNSPECIFIED DEMENTIA SEVERITY, UNSPECIFIED TIMING OF DEMENTIA ONSET (MULTI): Primary | ICD-10-CM

## 2024-05-27 NOTE — PROGRESS NOTES
Subjective   Patient ID: Elida Perry is a 77 y.o. female who is long term resident being seen and evaluated for multiple medical problems.   MONALISA don is a 77 year old female LTC resident Patient is a DNR CCA Signed paperwork in chart. On exam patient resting in bed. Patient Alert 1-2 Patient denies A/V hallucinations. Patient voices no complaints, denies pain or discomfort. Appetite fair at baseline. No acute medical needs   /72 P 80 RR 16    Physical Exam  Constitutional: awake, afebrile, not in acute distress  Eyes: clear sclera  ENMT: mucous membranes moist  Respiratory/Thorax: CTAB, no rales/rhonchi/wheezing  Cardiovascular: RRR, no rubs/murmurs/gallops  Gastrointestinal: soft, nt/nd/ng/nr. PEG in place  Extremities: no edema, no cellulitis  Neurological: Awake and alert at her baseline/dementia   Psychological: calm .     Assessment/Plan   Problem List Items Addressed This Visit             ICD-10-CM    Dermatitis L30.9    Primary hypertension I10    Hyperlipidemia E78.5    Alzheimer's dementia (Multi) - Primary G30.9, F02.80    Anxiety and depression F41.9, F32.A    Protein-calorie malnutrition (Multi) E46    Dysphagia R13.10    Physical debility R53.81    History of deep venous thrombosis (DVT) of distal vein of left lower extremity Z86.718    Anemia D64.9    Neuropathy G62.9    Chronic neck pain with osteoarthritis determined by x-ray M47.812, G89.29         #Chronic Pain Management: (G89.4)/ Osteoarthritis (715.90) (M19.90)  -Patient Reports pain is poorly controlled  -Nurse to offer non-pharmacological pain control interventions prior to administering as needed pain medication ie.. (1) Change in position (2) rest (3) deep breathing exercises (4) back rub (5) diversion/distraction (6) other  -c/w Tylenol, Lidocaine  #Anxiety (f41.1) Depression F32.3) Patient denies worsening Depression or anxiety RN reports no acute behavioral disturbances  -I will c/w Behavior monitoring for the following: mood  changes; anxious/agitated; angry; striking out/hitting  -Behavior monitoring for the following (1) mood changes (2) depressed mood (3) anxiety/agitation (4) hallucinations/delusions  - F/U with Psych c/w Medication treatment plan  -c/w Zoloft  # Dementia with behavioral disturbance, unspecified dementia type (294.21) (F03.91)  RN reports no acute behavioral disturbances Patient denies A/V hallucinations  -Behavior monitoring for the following (1) mood changes (2) depressed mood (3) anxiety/agitation (4) hallucinations/delusions  -F/U with Psych  -C/w Medication treatment Plan  -c/w Namenda, Risperidone  #HLD (hyperlipidemia) (272.4) (E78.5)  -Monitor Lipid Panel Q six months  -c/w Atorvastatin#Weakness generalized (780.79) (R53.1)  Patient has worsening physical deconditioning  -PT OT ST PRN  -Restorative Care PRN  - Monitor Labs  -Monitor Nutritional Status  LLE DVT ( I 82.409)  Continue with Eliquis  #Constipation (K59.00)  -RN reportspatient had Bowel movement this AM. Patient denies abdominal discomfort.  - I ordered RN to report No Bowel movementgreater than 3 days.  -c/w Colace Senna Dulcolax Suppository Fleet Enema PRNTime spending for 35 minutes   -reviewed of hospital record via EMR and reconciled medication in PCC and EMR (d/c summary). Reviewed orders, medications, records, and pertinent labs/x-rays from PCC. Reviewed VS, BS, O2, etc as per protocol. Reviewed and signed off orders, medications, Labs, x-rays, and current diagnoses in PCC  -Obtained history  -Performing physical examination  -Ordering medications, lab   -Documenting clinical information in the [facility]   -Care coordinating with nursing staff

## 2024-05-27 NOTE — PROGRESS NOTES
Subjective   Patient ID: Elida Perry is a 77 y.o. female who is long term resident being seen and evaluated for multiple medical problems.   MONALISA don is a 77 year old female LTC resident Patient is a DNR CCA Signed paperwork in chart. On exam patient resting in bed. Patient Alert 1-2 Patient denies A/V hallucinations. Patient voices no complaints, denies pain or discomfort. Appetite fair at baseline. No acute medical needs   /72 P 80 RR 16    Physical Exam  Constitutional: awake, afebrile, not in acute distress  Eyes: clear sclera  ENMT: mucous membranes moist  Respiratory/Thorax: CTAB, no rales/rhonchi/wheezing  Cardiovascular: RRR, no rubs/murmurs/gallops  Gastrointestinal: soft, nt/nd/ng/nr. PEG in place  Extremities: no edema, no cellulitis  Neurological: Awake and alert at her baseline/dementia   Psychological: calm .     Assessment/Plan   Problem List Items Addressed This Visit             ICD-10-CM    Dermatitis L30.9    Primary hypertension I10    Hyperlipidemia E78.5    Alzheimer's dementia (Multi) - Primary G30.9, F02.80    Anxiety and depression F41.9, F32.A    Protein-calorie malnutrition (Multi) E46    Dysphagia R13.10    Physical debility R53.81    History of deep venous thrombosis (DVT) of distal vein of left lower extremity Z86.718    Anemia D64.9    Neuropathy G62.9         #Chronic Pain Management: (G89.4)/ Osteoarthritis (715.90) (M19.90)  -Patient Reports pain is poorly controlled  -Nurse to offer non-pharmacological pain control interventions prior to administering as needed pain medication ie.. (1) Change in position (2) rest (3) deep breathing exercises (4) back rub (5) diversion/distraction (6) other  -c/w Tylenol, Lidocaine  #Anxiety (f41.1) Depression F32.3) Patient denies worsening Depression or anxiety RN reports no acute behavioral disturbances  -I will c/w Behavior monitoring for the following: mood changes; anxious/agitated; angry; striking out/hitting  -Behavior monitoring  for the following (1) mood changes (2) depressed mood (3) anxiety/agitation (4) hallucinations/delusions  - F/U with Psych c/w Medication treatment plan  -c/w Zoloft  # Dementia with behavioral disturbance, unspecified dementia type (294.21) (F03.91)  RN reports no acute behavioral disturbances Patient denies A/V hallucinations  -Behavior monitoring for the following (1) mood changes (2) depressed mood (3) anxiety/agitation (4) hallucinations/delusions  -F/U with Psych  -C/w Medication treatment Plan  -c/w Namenda, Risperidone  #HLD (hyperlipidemia) (272.4) (E78.5)  -Monitor Lipid Panel Q six months  -c/w Atorvastatin#Weakness generalized (780.79) (R53.1)  Patient has worsening physical deconditioning  -PT OT ST PRN  -Restorative Care PRN  - Monitor Labs  -Monitor Nutritional Status  LLE DVT ( I 82.409)  Continue with Eliquis  #Constipation (K59.00)  -RN reportspatient had Bowel movement this AM. Patient denies abdominal discomfort.  - I ordered RN to report No Bowel movementgreater than 3 days.  -c/w Colace Senna Dulcolax Suppository Fleet Enema PRNTime spending for 35 minutes   -reviewed of hospital record via EMR and reconciled medication in PCC and EMR (d/c summary). Reviewed orders, medications, records, and pertinent labs/x-rays from PCC. Reviewed VS, BS, O2, etc as per protocol. Reviewed and signed off orders, medications, Labs, x-rays, and current diagnoses in PCC  -Obtained history  -Performing physical examination  -Ordering medications, lab   -Documenting clinical information in the [facility]   -Care coordinating with nursing staff

## 2024-08-28 ENCOUNTER — APPOINTMENT (OUTPATIENT)
Dept: RADIOLOGY | Facility: HOSPITAL | Age: 79
DRG: 539 | End: 2024-08-28
Payer: COMMERCIAL

## 2024-08-28 ENCOUNTER — APPOINTMENT (OUTPATIENT)
Dept: CARDIOLOGY | Facility: HOSPITAL | Age: 79
DRG: 539 | End: 2024-08-28
Payer: COMMERCIAL

## 2024-08-28 ENCOUNTER — HOSPITAL ENCOUNTER (INPATIENT)
Facility: HOSPITAL | Age: 79
LOS: 3 days | Discharge: HOSPICE | DRG: 539 | End: 2024-09-01
Attending: EMERGENCY MEDICINE | Admitting: HOSPITALIST
Payer: COMMERCIAL

## 2024-08-28 DIAGNOSIS — R79.89 ELEVATED TROPONIN: ICD-10-CM

## 2024-08-28 DIAGNOSIS — G93.41 ACUTE METABOLIC ENCEPHALOPATHY: Primary | ICD-10-CM

## 2024-08-28 DIAGNOSIS — N17.9 AKI (ACUTE KIDNEY INJURY) (CMS-HCC): ICD-10-CM

## 2024-08-28 DIAGNOSIS — M46.28 SACRAL OSTEOMYELITIS (MULTI): ICD-10-CM

## 2024-08-28 DIAGNOSIS — E87.0 HYPERNATREMIA: ICD-10-CM

## 2024-08-28 LAB
ALBUMIN SERPL BCP-MCNC: 2.8 G/DL (ref 3.4–5)
ALP SERPL-CCNC: 87 U/L (ref 33–136)
ALT SERPL W P-5'-P-CCNC: 43 U/L (ref 7–45)
ANION GAP BLDV CALCULATED.4IONS-SCNC: 9 MMOL/L (ref 10–25)
ANION GAP SERPL CALC-SCNC: 17 MMOL/L (ref 10–20)
APPEARANCE UR: CLEAR
AST SERPL W P-5'-P-CCNC: 72 U/L (ref 9–39)
ATRIAL RATE: 104 BPM
BACTERIA #/AREA URNS AUTO: ABNORMAL /HPF
BASE EXCESS BLDV CALC-SCNC: 4.3 MMOL/L (ref -2–3)
BASOPHILS # BLD AUTO: 0.02 X10*3/UL (ref 0–0.1)
BASOPHILS # BLD AUTO: 0.02 X10*3/UL (ref 0–0.1)
BASOPHILS NFR BLD AUTO: 0.1 %
BASOPHILS NFR BLD AUTO: 0.2 %
BILIRUB SERPL-MCNC: 0.5 MG/DL (ref 0–1.2)
BILIRUB UR STRIP.AUTO-MCNC: NEGATIVE MG/DL
BODY TEMPERATURE: 37 DEGREES CELSIUS
BUN SERPL-MCNC: 81 MG/DL (ref 6–23)
CA-I BLDV-SCNC: 1.31 MMOL/L (ref 1.1–1.33)
CALCIUM SERPL-MCNC: 9.3 MG/DL (ref 8.6–10.3)
CARDIAC TROPONIN I PNL SERPL HS: 70 NG/L (ref 0–13)
CARDIAC TROPONIN I PNL SERPL HS: 89 NG/L (ref 0–13)
CHLORIDE BLDV-SCNC: 117 MMOL/L (ref 98–107)
CHLORIDE SERPL-SCNC: 115 MMOL/L (ref 98–107)
CO2 SERPL-SCNC: 23 MMOL/L (ref 21–32)
COLOR UR: NORMAL
CREAT SERPL-MCNC: 1.52 MG/DL (ref 0.5–1.05)
EGFRCR SERPLBLD CKD-EPI 2021: 35 ML/MIN/1.73M*2
EOSINOPHIL # BLD AUTO: 0.15 X10*3/UL (ref 0–0.4)
EOSINOPHIL # BLD AUTO: 0.21 X10*3/UL (ref 0–0.4)
EOSINOPHIL NFR BLD AUTO: 1.3 %
EOSINOPHIL NFR BLD AUTO: 1.6 %
ERYTHROCYTE [DISTWIDTH] IN BLOOD BY AUTOMATED COUNT: 14.6 % (ref 11.5–14.5)
ERYTHROCYTE [DISTWIDTH] IN BLOOD BY AUTOMATED COUNT: 14.9 % (ref 11.5–14.5)
GLUCOSE BLDV-MCNC: 134 MG/DL (ref 74–99)
GLUCOSE SERPL-MCNC: 128 MG/DL (ref 74–99)
GLUCOSE UR STRIP.AUTO-MCNC: NORMAL MG/DL
HCO3 BLDV-SCNC: 29.2 MMOL/L (ref 22–26)
HCT VFR BLD AUTO: 33.9 % (ref 36–46)
HCT VFR BLD AUTO: 35.3 % (ref 36–46)
HCT VFR BLD EST: 35 % (ref 36–46)
HGB BLD-MCNC: 10.4 G/DL (ref 12–16)
HGB BLD-MCNC: 11 G/DL (ref 12–16)
HGB BLDV-MCNC: 11.5 G/DL (ref 12–16)
IMM GRANULOCYTES # BLD AUTO: 0.05 X10*3/UL (ref 0–0.5)
IMM GRANULOCYTES # BLD AUTO: 0.06 X10*3/UL (ref 0–0.5)
IMM GRANULOCYTES NFR BLD AUTO: 0.4 % (ref 0–0.9)
IMM GRANULOCYTES NFR BLD AUTO: 0.5 % (ref 0–0.9)
INHALED O2 CONCENTRATION: 21 %
INR PPP: 2.3 (ref 0.9–1.1)
KETONES UR STRIP.AUTO-MCNC: NEGATIVE MG/DL
LACTATE BLDV-SCNC: 1.8 MMOL/L (ref 0.4–2)
LACTATE SERPL-SCNC: 1.7 MMOL/L (ref 0.4–2)
LEUKOCYTE ESTERASE UR QL STRIP.AUTO: NEGATIVE
LYMPHOCYTES # BLD AUTO: 1.73 X10*3/UL (ref 0.8–3)
LYMPHOCYTES # BLD AUTO: 2.17 X10*3/UL (ref 0.8–3)
LYMPHOCYTES NFR BLD AUTO: 14.4 %
LYMPHOCYTES NFR BLD AUTO: 16.1 %
MCH RBC QN AUTO: 27.2 PG (ref 26–34)
MCH RBC QN AUTO: 27.2 PG (ref 26–34)
MCHC RBC AUTO-ENTMCNC: 30.7 G/DL (ref 32–36)
MCHC RBC AUTO-ENTMCNC: 31.2 G/DL (ref 32–36)
MCV RBC AUTO: 87 FL (ref 80–100)
MCV RBC AUTO: 89 FL (ref 80–100)
MONOCYTES # BLD AUTO: 1.09 X10*3/UL (ref 0.05–0.8)
MONOCYTES # BLD AUTO: 1.15 X10*3/UL (ref 0.05–0.8)
MONOCYTES NFR BLD AUTO: 8.5 %
MONOCYTES NFR BLD AUTO: 9.1 %
MUCOUS THREADS #/AREA URNS AUTO: ABNORMAL /LPF
NEUTROPHILS # BLD AUTO: 8.93 X10*3/UL (ref 1.6–5.5)
NEUTROPHILS # BLD AUTO: 9.91 X10*3/UL (ref 1.6–5.5)
NEUTROPHILS NFR BLD AUTO: 73.3 %
NEUTROPHILS NFR BLD AUTO: 74.5 %
NITRITE UR QL STRIP.AUTO: NEGATIVE
NRBC BLD-RTO: 0 /100 WBCS (ref 0–0)
NRBC BLD-RTO: 0 /100 WBCS (ref 0–0)
OXYHGB MFR BLDV: 87.6 % (ref 45–75)
P AXIS: 60 DEGREES
P OFFSET: 183 MS
P ONSET: 125 MS
PCO2 BLDV: 44 MM HG (ref 41–51)
PH BLDV: 7.43 PH (ref 7.33–7.43)
PH UR STRIP.AUTO: 5 [PH]
PLATELET # BLD AUTO: 178 X10*3/UL (ref 150–450)
PLATELET # BLD AUTO: 210 X10*3/UL (ref 150–450)
PO2 BLDV: 60 MM HG (ref 35–45)
POTASSIUM BLDV-SCNC: 3.8 MMOL/L (ref 3.5–5.3)
POTASSIUM SERPL-SCNC: 4.8 MMOL/L (ref 3.5–5.3)
PR INTERVAL: 186 MS
PROT SERPL-MCNC: 7.2 G/DL (ref 6.4–8.2)
PROT UR STRIP.AUTO-MCNC: NORMAL MG/DL
PROTHROMBIN TIME: 25.9 SECONDS (ref 9.8–12.8)
Q ONSET: 218 MS
QRS COUNT: 17 BEATS
QRS DURATION: 76 MS
QT INTERVAL: 372 MS
QTC CALCULATION(BAZETT): 489 MS
QTC FREDERICIA: 447 MS
R AXIS: -51 DEGREES
RBC # BLD AUTO: 3.82 X10*6/UL (ref 4–5.2)
RBC # BLD AUTO: 4.05 X10*6/UL (ref 4–5.2)
RBC # UR STRIP.AUTO: NEGATIVE /UL
RBC #/AREA URNS AUTO: ABNORMAL /HPF
SAO2 % BLDV: 90 % (ref 45–75)
SARS-COV-2 RNA RESP QL NAA+PROBE: NOT DETECTED
SODIUM BLDV-SCNC: 151 MMOL/L (ref 136–145)
SODIUM SERPL-SCNC: 150 MMOL/L (ref 136–145)
SP GR UR STRIP.AUTO: 1.02
SQUAMOUS #/AREA URNS AUTO: ABNORMAL /HPF
T AXIS: 73 DEGREES
T OFFSET: 404 MS
UROBILINOGEN UR STRIP.AUTO-MCNC: NORMAL MG/DL
VENTRICULAR RATE: 104 BPM
WBC # BLD AUTO: 12 X10*3/UL (ref 4.4–11.3)
WBC # BLD AUTO: 13.5 X10*3/UL (ref 4.4–11.3)
WBC #/AREA URNS AUTO: ABNORMAL /HPF

## 2024-08-28 PROCEDURE — 71045 X-RAY EXAM CHEST 1 VIEW: CPT | Mod: FOREIGN READ | Performed by: RADIOLOGY

## 2024-08-28 PROCEDURE — 74177 CT ABD & PELVIS W/CONTRAST: CPT | Performed by: RADIOLOGY

## 2024-08-28 PROCEDURE — 36415 COLL VENOUS BLD VENIPUNCTURE: CPT | Performed by: EMERGENCY MEDICINE

## 2024-08-28 PROCEDURE — 85025 COMPLETE CBC W/AUTO DIFF WBC: CPT | Performed by: EMERGENCY MEDICINE

## 2024-08-28 PROCEDURE — 71275 CT ANGIOGRAPHY CHEST: CPT

## 2024-08-28 PROCEDURE — 70450 CT HEAD/BRAIN W/O DYE: CPT | Performed by: RADIOLOGY

## 2024-08-28 PROCEDURE — 74177 CT ABD & PELVIS W/CONTRAST: CPT

## 2024-08-28 PROCEDURE — 99291 CRITICAL CARE FIRST HOUR: CPT | Performed by: EMERGENCY MEDICINE

## 2024-08-28 PROCEDURE — 71045 X-RAY EXAM CHEST 1 VIEW: CPT

## 2024-08-28 PROCEDURE — 2550000001 HC RX 255 CONTRASTS: Performed by: EMERGENCY MEDICINE

## 2024-08-28 PROCEDURE — 2500000004 HC RX 250 GENERAL PHARMACY W/ HCPCS (ALT 636 FOR OP/ED): Performed by: EMERGENCY MEDICINE

## 2024-08-28 PROCEDURE — 70450 CT HEAD/BRAIN W/O DYE: CPT

## 2024-08-28 PROCEDURE — 82435 ASSAY OF BLOOD CHLORIDE: CPT | Performed by: EMERGENCY MEDICINE

## 2024-08-28 PROCEDURE — 96365 THER/PROPH/DIAG IV INF INIT: CPT

## 2024-08-28 PROCEDURE — 81001 URINALYSIS AUTO W/SCOPE: CPT | Performed by: EMERGENCY MEDICINE

## 2024-08-28 PROCEDURE — 87635 SARS-COV-2 COVID-19 AMP PRB: CPT | Performed by: EMERGENCY MEDICINE

## 2024-08-28 PROCEDURE — 71275 CT ANGIOGRAPHY CHEST: CPT | Performed by: RADIOLOGY

## 2024-08-28 PROCEDURE — 84484 ASSAY OF TROPONIN QUANT: CPT | Performed by: EMERGENCY MEDICINE

## 2024-08-28 PROCEDURE — 96366 THER/PROPH/DIAG IV INF ADDON: CPT

## 2024-08-28 PROCEDURE — 87040 BLOOD CULTURE FOR BACTERIA: CPT | Mod: AHULAB | Performed by: EMERGENCY MEDICINE

## 2024-08-28 PROCEDURE — 83605 ASSAY OF LACTIC ACID: CPT | Performed by: EMERGENCY MEDICINE

## 2024-08-28 PROCEDURE — 84075 ASSAY ALKALINE PHOSPHATASE: CPT | Performed by: EMERGENCY MEDICINE

## 2024-08-28 PROCEDURE — 86140 C-REACTIVE PROTEIN: CPT | Performed by: EMERGENCY MEDICINE

## 2024-08-28 PROCEDURE — 85610 PROTHROMBIN TIME: CPT | Performed by: EMERGENCY MEDICINE

## 2024-08-28 PROCEDURE — 96367 TX/PROPH/DG ADDL SEQ IV INF: CPT

## 2024-08-28 PROCEDURE — 93005 ELECTROCARDIOGRAM TRACING: CPT

## 2024-08-28 PROCEDURE — 99285 EMERGENCY DEPT VISIT HI MDM: CPT

## 2024-08-28 PROCEDURE — 85652 RBC SED RATE AUTOMATED: CPT | Performed by: EMERGENCY MEDICINE

## 2024-08-28 PROCEDURE — 2500000005 HC RX 250 GENERAL PHARMACY W/O HCPCS: Performed by: EMERGENCY MEDICINE

## 2024-08-28 ASSESSMENT — PAIN - FUNCTIONAL ASSESSMENT: PAIN_FUNCTIONAL_ASSESSMENT: UNABLE TO SELF-REPORT

## 2024-08-28 NOTE — ED PROVIDER NOTES
History/Exam limitations: none.   Additional history was obtained from patient, relative(s), and past medical records.          HPI:    Elida Perry is a 79 y.o. female PMH chronic pain, anxiety, depression, dementia, hyperlipidemia, left lower extremity DVT on Eliquis, constipation presenting for evaluation of worsening mentation.  Patient's family bedside state that over the last 2 weeks she has had redly worsening altered mentation.  She is baseline alert and oriented x 0 nonverbal.  They have noticed that she is making fewer sounds that she normally does.  They also noticed over the last week that her sacral wound has been more foul-smelling.  Did not note any fevers or cough.      Physical Exam:  ED Triage Vitals [08/28/24 1239]   Temperature Heart Rate Respirations BP   36.5 °C (97.7 °F) (!) 128 18 (!) 137/106      Pulse Ox Temp Source Heart Rate Source Patient Position   96 % Tympanic Monitor --      BP Location FiO2 (%)     -- --        GEN:      Alert, lethargic  Eyes:       PERRL 3 to 2 mm, EOMI  HENT:      NC/AT, OP clear, airway patent, MM  CV:      RRR, no MRG, no LE pitting edema, 2+ radial and pedal pulses  PULM:     CTAB, no w/r/r, easy WOB, symmetric chest rise  ABD:      Soft, NT, ND, no masses, BS +  :       No CVA TTP  NEURO:   A&Ox0, nonverbal, symmetric spontaneous movement of all 4 extremities, withdraw in all 4 extremities  MSK:      FROM, no joint deformities or swelling, no e/o trauma  SKIN:       Warm and dry, foul-smelling sacral wound, no purulent discharge, no surrounding crepitus or significant erythema, superficial wound to the dorsum of the left foot with no surrounding crepitus or erythema, no drainage         MDM/ED Course:   Elida Perry is a 79 y.o. female PMH chronic pain, anxiety, depression, dementia, hyperlipidemia, left lower extremity DVT on Eliquis, constipation presenting for evaluation of worsening mentation.  Triage vitals markable for tachycardia with heart rate  128.  Stable blood pressure.  Called to patient's bedside when she was roomed in the ED due to concerns of hypotension.  Patient was found to be hypotensive with blood pressure of 84/46.  Sepsis alert was initiated.  Differential for altered mental status is extensive but includes intracranial pathology or bleed, ACS, seizure disorder in post-ictal state, infectious/inflammatory etiology, electrolyte abnormality, significant blood count anomaly most likely in the setting of significant anemia, thyroid storm or significant hypothyroidism, symptomatic arrhythmias, intoxication.. Will work up these diagnoses given limited history and exam.  Differential includes sepsis with etiology consisting of CNS, abdominal, respiratory, skin, and/or urine sources.  Less likely CNS source given no nuchal rigidity.  30 cc/kg IV fluids ordered.  Labs obtained and chemistry with sodium of 150, chloride 115, creatinine 1.52 from a baseline of 0.57 consistent with significant THALIA, suspect dehydration.  AST mildly elevated.  Glucose normal.  CBC with leukocytosis to 12.0 with left shift, hemoglobin 12.4.  VBG overall reassuring reassuring lactate, reassuring pH and CO2.  Lactate normal on serial labs.  COVID-negative.  Urinalysis with 4+ bacteria otherwise reassuring.  Patient was covered with broad-spectrum antibiotics including vancomycin and Zosyn given unclear source.  Patient's blood pressure subsequently improved.  Shortly after initiation of bolus to 110s to 130s over 50s to 60s.  Patient sacral ulceration is foul-smelling however the skin surrounding appears overall reassuring and no purulent discharge.  Wound of the dorsum of left foot overall well-appearing with no evidence of significant infection.  Given limited history and exam, CT head, CT angio chest and CT abdomen pelvis with IV contrast ordered and pending.  Chest x-ray with no signs of acute process per radiology read.  Initial troponin elevated at 89, EKG as below,  suspect demand, repeat was reassuring at 70, downtrending.  Patient care signed out to my oncoming colleague at shift change Dr. Joy pending imaging, continued management, disposition.    EKG reviewed by me: 1:05 PM sinus tachycardia, rate 104, left axis, normal MS and QRS interval, QTc prolonged at 489 ms, LVH, multiple PACs present, overall similar to prior EKGs with the exception of increased PAC burden.     ED Course as of 08/30/24 1803   Wed Aug 28, 2024   1521 Rhythm monitor.  Sinus with PACs. [JM]      ED Course User Index  [JM] David Antoine MD         Diagnoses as of 08/30/24 1803   Acute metabolic encephalopathy   Sacral osteomyelitis (Multi)   THALIA (acute kidney injury) (CMS-HCC)   Elevated troponin   Hypernatremia         Chronic medical conditions affecting care listed in MDM. I independently reviewed imaging studies and agreed with radiology reads. I reviewed recent and relevant outside records including PCP notes, Prior discharge summaries, and prior radiology reports.    Procedure  Critical Care    Performed by: David Antoine MD  Authorized by: David Antoine MD    Critical care provider statement:     Critical care time (minutes):  35    Critical care time was exclusive of:  Separately billable procedures and treating other patients    Critical care was necessary to treat or prevent imminent or life-threatening deterioration of the following conditions:  Sepsis    Critical care was time spent personally by me on the following activities:  Development of treatment plan with patient or surrogate, evaluation of patient's response to treatment, examination of patient, obtaining history from patient or surrogate, ordering and performing treatments and interventions, ordering and review of laboratory studies, pulse oximetry, re-evaluation of patient's condition and review of old charts      Diagnosis:   1.  Encephalopathy  2.  THALIA  3.  Elevated troponin  4.  Hypernatremia  5.  Sacral  wound    Dispo: Handoff in stable condition      Disclaimer: Portions of this note were dictated by speech recognition. An attempt at proof reading was made to minimize errors. Minor errors in transcription may be present.  Please call if questions.     David Antoine MD  08/30/24 4201

## 2024-08-28 NOTE — ED TRIAGE NOTES
PT TO ED VIA EMS WITH C/O AMS. PER EMS BASELINE MENTATION FOR PT IS A&OX0 W/ INCOMPREHENSIBLE SOUNDS. ON ARRIVAL PT IS A&OX0 WITH OCCASIONAL INCOMPREHENSIBLE SOUNDS. PT IS UNABLE TO SELF REPORT AT THIS TIME. PT IS TACHYCARDIC ON ARRIVAL

## 2024-08-29 PROBLEM — G93.41 ACUTE METABOLIC ENCEPHALOPATHY: Status: ACTIVE | Noted: 2024-08-29

## 2024-08-29 LAB
ANION GAP SERPL CALC-SCNC: 11 MMOL/L (ref 10–20)
BUN SERPL-MCNC: 54 MG/DL (ref 6–23)
CALCIUM SERPL-MCNC: 8.4 MG/DL (ref 8.6–10.3)
CHLORIDE SERPL-SCNC: 117 MMOL/L (ref 98–107)
CO2 SERPL-SCNC: 26 MMOL/L (ref 21–32)
CREAT SERPL-MCNC: 1.08 MG/DL (ref 0.5–1.05)
CRP SERPL-MCNC: 13.36 MG/DL
EGFRCR SERPLBLD CKD-EPI 2021: 53 ML/MIN/1.73M*2
ERYTHROCYTE [SEDIMENTATION RATE] IN BLOOD BY WESTERGREN METHOD: 116 MM/H (ref 0–30)
GLUCOSE SERPL-MCNC: 118 MG/DL (ref 74–99)
HOLD SPECIMEN: NORMAL
HOLD SPECIMEN: NORMAL
POTASSIUM SERPL-SCNC: 3.9 MMOL/L (ref 3.5–5.3)
SODIUM SERPL-SCNC: 150 MMOL/L (ref 136–145)

## 2024-08-29 PROCEDURE — 2500000004 HC RX 250 GENERAL PHARMACY W/ HCPCS (ALT 636 FOR OP/ED): Performed by: HOSPITALIST

## 2024-08-29 PROCEDURE — 2500000004 HC RX 250 GENERAL PHARMACY W/ HCPCS (ALT 636 FOR OP/ED): Performed by: EMERGENCY MEDICINE

## 2024-08-29 PROCEDURE — 96375 TX/PRO/DX INJ NEW DRUG ADDON: CPT

## 2024-08-29 PROCEDURE — 36415 COLL VENOUS BLD VENIPUNCTURE: CPT | Performed by: HOSPITALIST

## 2024-08-29 PROCEDURE — 2500000001 HC RX 250 WO HCPCS SELF ADMINISTERED DRUGS (ALT 637 FOR MEDICARE OP): Performed by: PHARMACIST

## 2024-08-29 PROCEDURE — 2500000004 HC RX 250 GENERAL PHARMACY W/ HCPCS (ALT 636 FOR OP/ED): Performed by: INTERNAL MEDICINE

## 2024-08-29 PROCEDURE — 2500000001 HC RX 250 WO HCPCS SELF ADMINISTERED DRUGS (ALT 637 FOR MEDICARE OP): Performed by: HOSPITALIST

## 2024-08-29 PROCEDURE — 80048 BASIC METABOLIC PNL TOTAL CA: CPT | Performed by: HOSPITALIST

## 2024-08-29 PROCEDURE — 2500000004 HC RX 250 GENERAL PHARMACY W/ HCPCS (ALT 636 FOR OP/ED): Performed by: PHARMACIST

## 2024-08-29 PROCEDURE — 99223 1ST HOSP IP/OBS HIGH 75: CPT | Performed by: INTERNAL MEDICINE

## 2024-08-29 PROCEDURE — 1100000001 HC PRIVATE ROOM DAILY

## 2024-08-29 PROCEDURE — 2500000002 HC RX 250 W HCPCS SELF ADMINISTERED DRUGS (ALT 637 FOR MEDICARE OP, ALT 636 FOR OP/ED): Performed by: HOSPITALIST

## 2024-08-29 PROCEDURE — 96361 HYDRATE IV INFUSION ADD-ON: CPT

## 2024-08-29 RX ORDER — SERTRALINE HYDROCHLORIDE 100 MG/1
1 TABLET, FILM COATED ORAL EVERY MORNING
COMMUNITY
Start: 2018-10-02

## 2024-08-29 RX ORDER — DEXTROSE MONOHYDRATE 50 MG/ML
50 INJECTION, SOLUTION INTRAVENOUS CONTINUOUS
Status: DISCONTINUED | OUTPATIENT
Start: 2024-08-29 | End: 2024-09-01

## 2024-08-29 RX ORDER — ADHESIVE BANDAGE
30 BANDAGE TOPICAL DAILY PRN
COMMUNITY

## 2024-08-29 RX ORDER — CLOBETASOL PROPIONATE 0.5 MG/G
1 CREAM TOPICAL EVERY 12 HOURS PRN
COMMUNITY
Start: 2024-08-09

## 2024-08-29 RX ORDER — MORPHINE SULFATE 2 MG/ML
1 INJECTION, SOLUTION INTRAMUSCULAR; INTRAVENOUS EVERY 4 HOURS PRN
Status: DISCONTINUED | OUTPATIENT
Start: 2024-08-29 | End: 2024-08-29

## 2024-08-29 RX ORDER — GABAPENTIN 300 MG/1
300 CAPSULE ORAL DAILY
Status: DISCONTINUED | OUTPATIENT
Start: 2024-08-29 | End: 2024-09-01 | Stop reason: HOSPADM

## 2024-08-29 RX ORDER — TRAZODONE HYDROCHLORIDE 50 MG/1
75 TABLET ORAL NIGHTLY PRN
Status: DISCONTINUED | OUTPATIENT
Start: 2024-08-29 | End: 2024-09-01 | Stop reason: HOSPADM

## 2024-08-29 RX ORDER — MORPHINE SULFATE 4 MG/ML
4 INJECTION, SOLUTION INTRAMUSCULAR; INTRAVENOUS EVERY 4 HOURS PRN
Status: DISCONTINUED | OUTPATIENT
Start: 2024-08-29 | End: 2024-08-30

## 2024-08-29 RX ORDER — BISACODYL 10 MG/1
10 SUPPOSITORY RECTAL ONCE
Status: DISCONTINUED | OUTPATIENT
Start: 2024-08-29 | End: 2024-09-01 | Stop reason: HOSPADM

## 2024-08-29 RX ORDER — DONEPEZIL HYDROCHLORIDE 5 MG/1
10 TABLET, FILM COATED ORAL DAILY
Status: DISCONTINUED | OUTPATIENT
Start: 2024-08-29 | End: 2024-09-01 | Stop reason: HOSPADM

## 2024-08-29 RX ORDER — BISACODYL 5 MG
5 TABLET, DELAYED RELEASE (ENTERIC COATED) ORAL DAILY PRN
Status: DISCONTINUED | OUTPATIENT
Start: 2024-08-29 | End: 2024-09-01 | Stop reason: HOSPADM

## 2024-08-29 RX ORDER — FOLIC ACID 1 MG/1
1 TABLET ORAL DAILY
COMMUNITY
Start: 2013-09-10

## 2024-08-29 RX ORDER — DONEPEZIL HYDROCHLORIDE 10 MG/1
1 TABLET, FILM COATED ORAL DAILY
COMMUNITY
Start: 2015-11-03

## 2024-08-29 RX ORDER — TRAMADOL HYDROCHLORIDE 50 MG/1
0.5 TABLET ORAL 2 TIMES DAILY
COMMUNITY
Start: 2021-09-16

## 2024-08-29 RX ORDER — TRAZODONE HYDROCHLORIDE 50 MG/1
75 TABLET ORAL
COMMUNITY
Start: 2019-10-11

## 2024-08-29 RX ORDER — MORPHINE SULFATE 4 MG/ML
4 INJECTION, SOLUTION INTRAMUSCULAR; INTRAVENOUS ONCE
Status: COMPLETED | OUTPATIENT
Start: 2024-08-29 | End: 2024-08-29

## 2024-08-29 RX ORDER — MEMANTINE HYDROCHLORIDE 10 MG/1
1 TABLET ORAL EVERY 12 HOURS
COMMUNITY
Start: 2023-07-25

## 2024-08-29 RX ORDER — FERROUS SULFATE, DRIED 160(50) MG
1 TABLET, EXTENDED RELEASE ORAL EVERY 12 HOURS SCHEDULED
Status: DISCONTINUED | OUTPATIENT
Start: 2024-08-29 | End: 2024-09-01 | Stop reason: HOSPADM

## 2024-08-29 RX ORDER — BISACODYL 10 MG/1
10 SUPPOSITORY RECTAL DAILY PRN
COMMUNITY

## 2024-08-29 RX ORDER — FERROUS SULFATE, DRIED 160(50) MG
1 TABLET, EXTENDED RELEASE ORAL EVERY 12 HOURS
COMMUNITY
Start: 2023-07-25

## 2024-08-29 RX ORDER — SERTRALINE HYDROCHLORIDE 50 MG/1
125 TABLET, FILM COATED ORAL EVERY MORNING
Status: DISCONTINUED | OUTPATIENT
Start: 2024-08-29 | End: 2024-09-01 | Stop reason: HOSPADM

## 2024-08-29 RX ORDER — FAMOTIDINE 20 MG/1
10 TABLET, FILM COATED ORAL
Status: DISCONTINUED | OUTPATIENT
Start: 2024-08-29 | End: 2024-09-01 | Stop reason: HOSPADM

## 2024-08-29 RX ORDER — AMLODIPINE BESYLATE 5 MG/1
5 TABLET ORAL DAILY
Status: DISCONTINUED | OUTPATIENT
Start: 2024-08-29 | End: 2024-09-01 | Stop reason: HOSPADM

## 2024-08-29 RX ORDER — VANCOMYCIN HYDROCHLORIDE 1 G/20ML
INJECTION, POWDER, LYOPHILIZED, FOR SOLUTION INTRAVENOUS DAILY PRN
Status: DISCONTINUED | OUTPATIENT
Start: 2024-08-29 | End: 2024-08-29

## 2024-08-29 RX ORDER — MONTELUKAST SODIUM 10 MG/1
10 TABLET ORAL NIGHTLY
Status: DISCONTINUED | OUTPATIENT
Start: 2024-08-29 | End: 2024-09-01 | Stop reason: HOSPADM

## 2024-08-29 RX ORDER — POLYETHYLENE GLYCOL 3350 17 G/17G
17 POWDER, FOR SOLUTION ORAL 2 TIMES DAILY
Status: DISCONTINUED | OUTPATIENT
Start: 2024-08-29 | End: 2024-09-01 | Stop reason: HOSPADM

## 2024-08-29 RX ORDER — ACETAMINOPHEN 325 MG/1
1 TABLET ORAL EVERY 8 HOURS PRN
COMMUNITY
Start: 2023-05-08

## 2024-08-29 RX ORDER — FAMOTIDINE 10 MG/1
1 TABLET ORAL EVERY 12 HOURS
COMMUNITY
Start: 2023-07-25

## 2024-08-29 RX ORDER — ACETAMINOPHEN, DIPHENHYDRAMINE HCL, PHENYLEPHRINE HCL 325; 25; 5 MG/1; MG/1; MG/1
1 TABLET ORAL NIGHTLY
COMMUNITY
Start: 2020-01-23

## 2024-08-29 RX ORDER — MONTELUKAST SODIUM 10 MG/1
1 TABLET ORAL NIGHTLY
COMMUNITY
Start: 2018-01-25

## 2024-08-29 RX ORDER — TRAMADOL HYDROCHLORIDE 50 MG/1
50 TABLET ORAL EVERY 8 HOURS PRN
Status: DISCONTINUED | OUTPATIENT
Start: 2024-08-29 | End: 2024-08-29

## 2024-08-29 RX ORDER — SERTRALINE HYDROCHLORIDE 25 MG/1
1 TABLET, FILM COATED ORAL EVERY MORNING
COMMUNITY
Start: 2021-09-16

## 2024-08-29 RX ORDER — HYDROXYZINE HYDROCHLORIDE 25 MG/1
25 TABLET, FILM COATED ORAL EVERY 8 HOURS PRN
Status: DISCONTINUED | OUTPATIENT
Start: 2024-08-29 | End: 2024-09-01 | Stop reason: HOSPADM

## 2024-08-29 RX ORDER — TALC
9 POWDER (GRAM) TOPICAL NIGHTLY
Status: DISCONTINUED | OUTPATIENT
Start: 2024-08-29 | End: 2024-09-01 | Stop reason: HOSPADM

## 2024-08-29 RX ORDER — AMLODIPINE BESYLATE 5 MG/1
1 TABLET ORAL DAILY
COMMUNITY
Start: 2013-04-10

## 2024-08-29 RX ORDER — MORPHINE SULFATE 2 MG/ML
2 INJECTION, SOLUTION INTRAMUSCULAR; INTRAVENOUS EVERY 4 HOURS PRN
Status: DISCONTINUED | OUTPATIENT
Start: 2024-08-29 | End: 2024-08-30

## 2024-08-29 RX ORDER — ONDANSETRON HYDROCHLORIDE 2 MG/ML
4 INJECTION, SOLUTION INTRAVENOUS ONCE
Status: COMPLETED | OUTPATIENT
Start: 2024-08-29 | End: 2024-08-29

## 2024-08-29 RX ORDER — TRAMADOL HYDROCHLORIDE 50 MG/1
25 TABLET ORAL EVERY 12 HOURS PRN
Status: DISCONTINUED | OUTPATIENT
Start: 2024-08-29 | End: 2024-08-29

## 2024-08-29 RX ORDER — DICLOFENAC SODIUM 10 MG/G
1 GEL TOPICAL 3 TIMES DAILY
COMMUNITY
Start: 2024-04-25

## 2024-08-29 RX ORDER — FOLIC ACID 1 MG/1
1 TABLET ORAL DAILY
Status: DISCONTINUED | OUTPATIENT
Start: 2024-08-29 | End: 2024-09-01 | Stop reason: HOSPADM

## 2024-08-29 RX ORDER — VANCOMYCIN HCL 50 MG/ML
125 SOLUTION, RECONSTITUTED, ORAL ORAL
COMMUNITY
Start: 2021-09-16

## 2024-08-29 RX ORDER — NYSTATIN 100000 [USP'U]/ML
5 SUSPENSION ORAL 4 TIMES DAILY
Status: DISCONTINUED | OUTPATIENT
Start: 2024-08-29 | End: 2024-09-01 | Stop reason: HOSPADM

## 2024-08-29 RX ORDER — ATORVASTATIN CALCIUM 40 MG/1
1 TABLET, FILM COATED ORAL EVERY 24 HOURS
COMMUNITY
Start: 2013-03-10

## 2024-08-29 RX ORDER — FERROUS SULFATE 325(65) MG
65 TABLET ORAL 2 TIMES DAILY
Status: DISCONTINUED | OUTPATIENT
Start: 2024-08-29 | End: 2024-09-01 | Stop reason: HOSPADM

## 2024-08-29 RX ORDER — HYDROXYZINE HYDROCHLORIDE 25 MG/1
1 TABLET, FILM COATED ORAL EVERY 8 HOURS
COMMUNITY
Start: 2023-06-09

## 2024-08-29 RX ORDER — BALSAM PERU/CASTOR OIL
1 OINTMENT (GRAM) TOPICAL 3 TIMES DAILY
COMMUNITY

## 2024-08-29 RX ORDER — GABAPENTIN 300 MG/1
1 CAPSULE ORAL 2 TIMES DAILY
COMMUNITY
Start: 2017-06-07

## 2024-08-29 RX ORDER — L. ACIDOPHILUS/L.BULGARICUS 1MM CELL
1 TABLET ORAL DAILY
COMMUNITY
Start: 2024-06-24

## 2024-08-29 RX ORDER — ATORVASTATIN CALCIUM 40 MG/1
40 TABLET, FILM COATED ORAL EVERY 24 HOURS
Status: DISCONTINUED | OUTPATIENT
Start: 2024-08-29 | End: 2024-09-01 | Stop reason: HOSPADM

## 2024-08-29 RX ORDER — BISACODYL 10 MG/1
10 SUPPOSITORY RECTAL DAILY PRN
Status: DISCONTINUED | OUTPATIENT
Start: 2024-08-29 | End: 2024-09-01 | Stop reason: HOSPADM

## 2024-08-29 RX ORDER — FAMOTIDINE 20 MG/1
10 TABLET, FILM COATED ORAL EVERY 12 HOURS
Status: DISCONTINUED | OUTPATIENT
Start: 2024-08-29 | End: 2024-08-29

## 2024-08-29 RX ORDER — MEMANTINE HYDROCHLORIDE 5 MG/1
10 TABLET ORAL EVERY 12 HOURS SCHEDULED
Status: DISCONTINUED | OUTPATIENT
Start: 2024-08-29 | End: 2024-09-01 | Stop reason: HOSPADM

## 2024-08-29 RX ORDER — GABAPENTIN 300 MG/1
300 CAPSULE ORAL 2 TIMES DAILY
Status: DISCONTINUED | OUTPATIENT
Start: 2024-08-29 | End: 2024-08-29

## 2024-08-29 RX ORDER — FERROUS SULFATE TAB 325 MG (65 MG ELEMENTAL FE) 325 (65 FE) MG
325 TAB ORAL 2 TIMES DAILY
COMMUNITY
Start: 2024-07-10

## 2024-08-29 SDOH — ECONOMIC STABILITY: FOOD INSECURITY: WITHIN THE PAST 12 MONTHS, YOU WORRIED THAT YOUR FOOD WOULD RUN OUT BEFORE YOU GOT MONEY TO BUY MORE.: NEVER TRUE

## 2024-08-29 SDOH — HEALTH STABILITY: MENTAL HEALTH: HOW OFTEN DO YOU HAVE A DRINK CONTAINING ALCOHOL?: NEVER

## 2024-08-29 SDOH — SOCIAL STABILITY: SOCIAL NETWORK: ARE YOU MARRIED, WIDOWED, DIVORCED, SEPARATED, NEVER MARRIED, OR LIVING WITH A PARTNER?: DIVORCED

## 2024-08-29 SDOH — HEALTH STABILITY: MENTAL HEALTH: HOW OFTEN DO YOU HAVE 6 OR MORE DRINKS ON ONE OCCASION?: NEVER

## 2024-08-29 SDOH — ECONOMIC STABILITY: INCOME INSECURITY
HOW HARD IS IT FOR YOU TO PAY FOR THE VERY BASICS LIKE FOOD, HOUSING, MEDICAL CARE, AND HEATING?: PATIENT UNABLE TO ANSWER

## 2024-08-29 SDOH — HEALTH STABILITY: PHYSICAL HEALTH
ON AVERAGE, HOW MANY DAYS PER WEEK DO YOU ENGAGE IN MODERATE TO STRENUOUS EXERCISE (LIKE A BRISK WALK)?: PATIENT UNABLE TO ANSWER

## 2024-08-29 SDOH — SOCIAL STABILITY: SOCIAL NETWORK: HOW OFTEN DO YOU ATTENT MEETINGS OF THE CLUB OR ORGANIZATION YOU BELONG TO?: PATIENT UNABLE TO ANSWER

## 2024-08-29 SDOH — HEALTH STABILITY: MENTAL HEALTH: HOW MANY STANDARD DRINKS CONTAINING ALCOHOL DO YOU HAVE ON A TYPICAL DAY?: PATIENT DOES NOT DRINK

## 2024-08-29 SDOH — SOCIAL STABILITY: SOCIAL INSECURITY: WERE YOU ABLE TO COMPLETE ALL THE BEHAVIORAL HEALTH SCREENINGS?: YES

## 2024-08-29 SDOH — SOCIAL STABILITY: SOCIAL NETWORK: HOW OFTEN DO YOU ATTEND CHURCH OR RELIGIOUS SERVICES?: PATIENT UNABLE TO ANSWER

## 2024-08-29 SDOH — ECONOMIC STABILITY: INCOME INSECURITY
IN THE LAST 12 MONTHS, WAS THERE A TIME WHEN YOU WERE NOT ABLE TO PAY THE MORTGAGE OR RENT ON TIME?: PATIENT UNABLE TO ANSWER

## 2024-08-29 SDOH — SOCIAL STABILITY: SOCIAL NETWORK: IN A TYPICAL WEEK, HOW MANY TIMES DO YOU TALK ON THE PHONE WITH FAMILY, FRIENDS, OR NEIGHBORS?: PATIENT UNABLE TO ANSWER

## 2024-08-29 SDOH — ECONOMIC STABILITY: TRANSPORTATION INSECURITY
IN THE PAST 12 MONTHS, HAS LACK OF TRANSPORTATION KEPT YOU FROM MEETINGS, WORK, OR FROM GETTING THINGS NEEDED FOR DAILY LIVING?: PATIENT UNABLE TO ANSWER

## 2024-08-29 SDOH — HEALTH STABILITY: MENTAL HEALTH
STRESS IS WHEN SOMEONE FEELS TENSE, NERVOUS, ANXIOUS, OR CAN'T SLEEP AT NIGHT BECAUSE THEIR MIND IS TROUBLED. HOW STRESSED ARE YOU?: PATIENT UNABLE TO ANSWER

## 2024-08-29 SDOH — SOCIAL STABILITY: SOCIAL INSECURITY: HAS ANYONE EVER THREATENED TO HURT YOUR FAMILY OR YOUR PETS?: UNABLE TO ASSESS

## 2024-08-29 SDOH — SOCIAL STABILITY: SOCIAL INSECURITY
WITHIN THE LAST YEAR, HAVE YOU BEEN HUMILIATED OR EMOTIONALLY ABUSED IN OTHER WAYS BY YOUR PARTNER OR EX-PARTNER?: PATIENT UNABLE TO ANSWER

## 2024-08-29 SDOH — SOCIAL STABILITY: SOCIAL INSECURITY
WITHIN THE LAST YEAR, HAVE TO BEEN RAPED OR FORCED TO HAVE ANY KIND OF SEXUAL ACTIVITY BY YOUR PARTNER OR EX-PARTNER?: PATIENT UNABLE TO ANSWER

## 2024-08-29 SDOH — HEALTH STABILITY: PHYSICAL HEALTH: ON AVERAGE, HOW MANY DAYS PER WEEK DO YOU ENGAGE IN MODERATE TO STRENUOUS EXERCISE (LIKE A BRISK WALK)?: 0 DAYS

## 2024-08-29 SDOH — SOCIAL STABILITY: SOCIAL INSECURITY: WITHIN THE LAST YEAR, HAVE YOU BEEN AFRAID OF YOUR PARTNER OR EX-PARTNER?: PATIENT UNABLE TO ANSWER

## 2024-08-29 SDOH — HEALTH STABILITY: MENTAL HEALTH
HOW OFTEN DO YOU NEED TO HAVE SOMEONE HELP YOU WHEN YOU READ INSTRUCTIONS, PAMPHLETS, OR OTHER WRITTEN MATERIAL FROM YOUR DOCTOR OR PHARMACY?: ALWAYS

## 2024-08-29 SDOH — SOCIAL STABILITY: SOCIAL NETWORK: ARE YOU MARRIED, WIDOWED, DIVORCED, SEPARATED, NEVER MARRIED, OR LIVING WITH A PARTNER?: PATIENT UNABLE TO ANSWER

## 2024-08-29 SDOH — SOCIAL STABILITY: SOCIAL INSECURITY: ARE THERE ANY APPARENT SIGNS OF INJURIES/BEHAVIORS THAT COULD BE RELATED TO ABUSE/NEGLECT?: UNABLE TO ASSESS

## 2024-08-29 SDOH — ECONOMIC STABILITY: HOUSING INSECURITY: IN THE PAST 12 MONTHS, HOW MANY TIMES HAVE YOU MOVED WHERE YOU WERE LIVING?: 0

## 2024-08-29 SDOH — ECONOMIC STABILITY: INCOME INSECURITY: IN THE PAST 12 MONTHS, HAS THE ELECTRIC, GAS, OIL, OR WATER COMPANY THREATENED TO SHUT OFF SERVICE IN YOUR HOME?: NO

## 2024-08-29 SDOH — ECONOMIC STABILITY: FOOD INSECURITY: WITHIN THE PAST 12 MONTHS, THE FOOD YOU BOUGHT JUST DIDN'T LAST AND YOU DIDN'T HAVE MONEY TO GET MORE.: NEVER TRUE

## 2024-08-29 SDOH — ECONOMIC STABILITY: HOUSING INSECURITY: AT ANY TIME IN THE PAST 12 MONTHS, WERE YOU HOMELESS OR LIVING IN A SHELTER (INCLUDING NOW)?: PATIENT UNABLE TO ANSWER

## 2024-08-29 SDOH — SOCIAL STABILITY: SOCIAL NETWORK
DO YOU BELONG TO ANY CLUBS OR ORGANIZATIONS SUCH AS CHURCH GROUPS UNIONS, FRATERNAL OR ATHLETIC GROUPS, OR SCHOOL GROUPS?: PATIENT UNABLE TO ANSWER

## 2024-08-29 SDOH — SOCIAL STABILITY: SOCIAL NETWORK: HOW OFTEN DO YOU GET TOGETHER WITH FRIENDS OR RELATIVES?: PATIENT UNABLE TO ANSWER

## 2024-08-29 SDOH — SOCIAL STABILITY: SOCIAL INSECURITY: DO YOU FEEL ANYONE HAS EXPLOITED OR TAKEN ADVANTAGE OF YOU FINANCIALLY OR OF YOUR PERSONAL PROPERTY?: UNABLE TO ASSESS

## 2024-08-29 SDOH — ECONOMIC STABILITY: FOOD INSECURITY
WITHIN THE PAST 12 MONTHS, THE FOOD YOU BOUGHT JUST DIDN'T LAST AND YOU DIDN'T HAVE MONEY TO GET MORE.: PATIENT UNABLE TO ANSWER

## 2024-08-29 SDOH — HEALTH STABILITY: PHYSICAL HEALTH: ON AVERAGE, HOW MANY MINUTES DO YOU ENGAGE IN EXERCISE AT THIS LEVEL?: 0 MIN

## 2024-08-29 SDOH — ECONOMIC STABILITY: TRANSPORTATION INSECURITY
IN THE PAST 12 MONTHS, HAS THE LACK OF TRANSPORTATION KEPT YOU FROM MEDICAL APPOINTMENTS OR FROM GETTING MEDICATIONS?: PATIENT UNABLE TO ANSWER

## 2024-08-29 SDOH — SOCIAL STABILITY: SOCIAL INSECURITY: HAVE YOU HAD ANY THOUGHTS OF HARMING ANYONE ELSE?: UNABLE TO ASSESS

## 2024-08-29 SDOH — SOCIAL STABILITY: SOCIAL INSECURITY
WITHIN THE LAST YEAR, HAVE YOU BEEN KICKED, HIT, SLAPPED, OR OTHERWISE PHYSICALLY HURT BY YOUR PARTNER OR EX-PARTNER?: PATIENT UNABLE TO ANSWER

## 2024-08-29 SDOH — SOCIAL STABILITY: SOCIAL INSECURITY: DOES ANYONE TRY TO KEEP YOU FROM HAVING/CONTACTING OTHER FRIENDS OR DOING THINGS OUTSIDE YOUR HOME?: UNABLE TO ASSESS

## 2024-08-29 SDOH — SOCIAL STABILITY: SOCIAL INSECURITY: DO YOU FEEL UNSAFE GOING BACK TO THE PLACE WHERE YOU ARE LIVING?: UNABLE TO ASSESS

## 2024-08-29 SDOH — SOCIAL STABILITY: SOCIAL INSECURITY: ARE YOU OR HAVE YOU BEEN THREATENED OR ABUSED PHYSICALLY, EMOTIONALLY, OR SEXUALLY BY ANYONE?: UNABLE TO ASSESS

## 2024-08-29 SDOH — SOCIAL STABILITY: SOCIAL INSECURITY: ABUSE: ADULT

## 2024-08-29 SDOH — ECONOMIC STABILITY: FOOD INSECURITY
WITHIN THE PAST 12 MONTHS, YOU WORRIED THAT YOUR FOOD WOULD RUN OUT BEFORE YOU GOT MONEY TO BUY MORE.: PATIENT UNABLE TO ANSWER

## 2024-08-29 SDOH — HEALTH STABILITY: PHYSICAL HEALTH: ON AVERAGE, HOW MANY MINUTES DO YOU ENGAGE IN EXERCISE AT THIS LEVEL?: PATIENT UNABLE TO ANSWER

## 2024-08-29 SDOH — SOCIAL STABILITY: SOCIAL INSECURITY: HAVE YOU HAD THOUGHTS OF HARMING ANYONE ELSE?: NO

## 2024-08-29 ASSESSMENT — COGNITIVE AND FUNCTIONAL STATUS - GENERAL
MOBILITY SCORE: 6
CLIMB 3 TO 5 STEPS WITH RAILING: TOTAL
EATING MEALS: TOTAL
MOVING FROM LYING ON BACK TO SITTING ON SIDE OF FLAT BED WITH BEDRAILS: TOTAL
WALKING IN HOSPITAL ROOM: TOTAL
TOILETING: TOTAL
STANDING UP FROM CHAIR USING ARMS: TOTAL
MOBILITY SCORE: 6
DAILY ACTIVITIY SCORE: 6
TURNING FROM BACK TO SIDE WHILE IN FLAT BAD: TOTAL
DRESSING REGULAR LOWER BODY CLOTHING: TOTAL
MOVING TO AND FROM BED TO CHAIR: TOTAL
HELP NEEDED FOR BATHING: TOTAL
DRESSING REGULAR LOWER BODY CLOTHING: TOTAL
MOVING FROM LYING ON BACK TO SITTING ON SIDE OF FLAT BED WITH BEDRAILS: TOTAL
DRESSING REGULAR LOWER BODY CLOTHING: TOTAL
WALKING IN HOSPITAL ROOM: TOTAL
DAILY ACTIVITIY SCORE: 6
MOVING TO AND FROM BED TO CHAIR: TOTAL
CLIMB 3 TO 5 STEPS WITH RAILING: TOTAL
DRESSING REGULAR UPPER BODY CLOTHING: TOTAL
DRESSING REGULAR UPPER BODY CLOTHING: TOTAL
STANDING UP FROM CHAIR USING ARMS: TOTAL
STANDING UP FROM CHAIR USING ARMS: TOTAL
CLIMB 3 TO 5 STEPS WITH RAILING: TOTAL
EATING MEALS: TOTAL
PERSONAL GROOMING: TOTAL
PATIENT BASELINE BEDBOUND: YES
MOVING FROM LYING ON BACK TO SITTING ON SIDE OF FLAT BED WITH BEDRAILS: TOTAL
HELP NEEDED FOR BATHING: TOTAL
HELP NEEDED FOR BATHING: TOTAL
EATING MEALS: TOTAL
DRESSING REGULAR UPPER BODY CLOTHING: TOTAL
DAILY ACTIVITIY SCORE: 6
WALKING IN HOSPITAL ROOM: TOTAL
MOVING TO AND FROM BED TO CHAIR: TOTAL
MOBILITY SCORE: 6
TURNING FROM BACK TO SIDE WHILE IN FLAT BAD: TOTAL
TOILETING: TOTAL
TOILETING: TOTAL
PERSONAL GROOMING: TOTAL
TURNING FROM BACK TO SIDE WHILE IN FLAT BAD: TOTAL
PERSONAL GROOMING: TOTAL

## 2024-08-29 ASSESSMENT — PAIN SCALES - PAIN ASSESSMENT IN ADVANCED DEMENTIA (PAINAD)
TOTALSCORE: 1
FACIALEXPRESSION: FACIAL GRIMACING
BODYLANGUAGE: TENSE, DISTRESSED PACING, FIDGETING
CONSOLABILITY: DISTRACTED OR REASSURED BY VOICE/TOUCH
BREATHING: NORMAL
BODYLANGUAGE: TENSE, DISTRESSED PACING, FIDGETING
TOTALSCORE: MEDICATION (SEE MAR)
FACIALEXPRESSION: FACIAL GRIMACING
BODYLANGUAGE: RELAXED
TOTALSCORE: 2
BREATHING: NORMAL
TOTALSCORE: 7
BODYLANGUAGE: RIGID, FISTS CLENCHED, KNEES UP, PUSHING/PULLING AWAY, STRIKES OUT
TOTALSCORE: 5
NEGVOCALIZATION: OCCASIONAL MOAN/GROAN, LOW SPEECH, NEGATIVE/DISAPPROVING QUALITY
BREATHING: OCCASIONAL LABORED BREATHING, SHORT PERIOD OF HYPERVENTILATION
TOTALSCORE: 6
FACIALEXPRESSION: SMILING OR INEXPRESSIVE
FACIALEXPRESSION: FACIAL GRIMACING
CONSOLABILITY: DISTRACTED OR REASSURED BY VOICE/TOUCH
BODYLANGUAGE: RIGID, FISTS CLENCHED, KNEES UP, PUSHING/PULLING AWAY, STRIKES OUT
BODYLANGUAGE: RIGID, FISTS CLENCHED, KNEES UP, PUSHING/PULLING AWAY, STRIKES OUT
FACIALEXPRESSION: FACIAL GRIMACING
BREATHING: NORMAL
BODYLANGUAGE: TENSE, DISTRESSED PACING, FIDGETING
CONSOLABILITY: DISTRACTED OR REASSURED BY VOICE/TOUCH
CONSOLABILITY: DISTRACTED OR REASSURED BY VOICE/TOUCH
TOTALSCORE: MEDICATION (SEE MAR)
NEGVOCALIZATION: REPEATED TROUBLED CALLING OUT, LOUD MOANING/GROANING, CRYING
TOTALSCORE: 0
CONSOLABILITY: NO NEED TO CONSOLE
NEGVOCALIZATION: OCCASIONAL MOAN/GROAN, LOW SPEECH, NEGATIVE/DISAPPROVING QUALITY
FACIALEXPRESSION: SMILING OR INEXPRESSIVE
CONSOLABILITY: NO NEED TO CONSOLE
TOTALSCORE: 8
FACIALEXPRESSION: SMILING OR INEXPRESSIVE
BREATHING: OCCASIONAL LABORED BREATHING, SHORT PERIOD OF HYPERVENTILATION
TOTALSCORE: MEDICATION (SEE MAR)
BREATHING: NORMAL
BREATHING: NORMAL
NEGVOCALIZATION: OCCASIONAL MOAN/GROAN, LOW SPEECH, NEGATIVE/DISAPPROVING QUALITY
NEGVOCALIZATION: OCCASIONAL MOAN/GROAN, LOW SPEECH, NEGATIVE/DISAPPROVING QUALITY
CONSOLABILITY: NO NEED TO CONSOLE

## 2024-08-29 ASSESSMENT — LIFESTYLE VARIABLES
AUDIT-C TOTAL SCORE: 0
HOW MANY STANDARD DRINKS CONTAINING ALCOHOL DO YOU HAVE ON A TYPICAL DAY: PATIENT DOES NOT DRINK
SKIP TO QUESTIONS 9-10: 1
AUDIT-C TOTAL SCORE: 0
PRESCIPTION_ABUSE_PAST_12_MONTHS: NO
AUDIT-C TOTAL SCORE: 0
SKIP TO QUESTIONS 9-10: 1
HOW OFTEN DO YOU HAVE 6 OR MORE DRINKS ON ONE OCCASION: NEVER
SUBSTANCE_ABUSE_PAST_12_MONTHS: NO
HOW OFTEN DO YOU HAVE A DRINK CONTAINING ALCOHOL: NEVER
AUDIT-C TOTAL SCORE: 0
SKIP TO QUESTIONS 9-10: 1

## 2024-08-29 ASSESSMENT — ACTIVITIES OF DAILY LIVING (ADL)
HEARING - LEFT EAR: DIFFICULTY WITH NOISE
TOILETING: DEPENDENT
PATIENT'S MEMORY ADEQUATE TO SAFELY COMPLETE DAILY ACTIVITIES?: NO
FEEDING YOURSELF: DEPENDENT
JUDGMENT_ADEQUATE_SAFELY_COMPLETE_DAILY_ACTIVITIES: NO
GROOMING: DEPENDENT
HEARING - RIGHT EAR: DIFFICULTY WITH NOISE
ADEQUATE_TO_COMPLETE_ADL: NO
DRESSING YOURSELF: DEPENDENT
BATHING: DEPENDENT
WALKS IN HOME: DEPENDENT

## 2024-08-29 ASSESSMENT — PAIN - FUNCTIONAL ASSESSMENT
PAIN_FUNCTIONAL_ASSESSMENT: UNABLE TO SELF-REPORT

## 2024-08-29 ASSESSMENT — PATIENT HEALTH QUESTIONNAIRE - PHQ9
1. LITTLE INTEREST OR PLEASURE IN DOING THINGS: NOT AT ALL
SUM OF ALL RESPONSES TO PHQ9 QUESTIONS 1 & 2: 0
2. FEELING DOWN, DEPRESSED OR HOPELESS: NOT AT ALL

## 2024-08-29 NOTE — PROGRESS NOTES
Vancomycin Dosing by Pharmacy- Cessation of Therapy    Consult to pharmacy for vancomycin dosing has been discontinued by the prescriber, pharmacy will sign off at this time.    Please call pharmacy if there are further questions or re-enter a consult if vancomycin is resumed.     Jenelle Rocha, PharmD

## 2024-08-29 NOTE — NURSING NOTE
Relayed request by daughter and POA Kairia that anyone who requests visitation and information about patient to provide the password ([email or web address]) to 5th floor nurse Cosmo.

## 2024-08-29 NOTE — CONSULTS
"INFECTIOUS DISEASE INPATIENT INITIAL CONSULTATION    Referred By: Radha Potts    Reason For Consult: sacral decub, possible osteo     HPI:  This is a 79 y.o. female with PMH of dementia, DVT on Eliquis, DLD, chronic pain, anxiety/depression who presented with confusion.    Baseline she is non-verbal and A+O x0. Apparently was making fewer sounds than usual. Sacral wound with worsening smell. No fevers.     Afebrile. WBC 12-13. Given IV Vanc/Zosyn. Blood cx x2 pending. UA without pyuria.     Allergies:  Patient has no known allergies.     Vitals (Last 24 Hours):  Heart Rate:  []   Temp:  [36.4 °C (97.5 °F)-36.5 °C (97.7 °F)]   Resp:  [16-18]   BP: ()/()   Height:  [162.6 cm (5' 4\")]   Weight:  [77.1 kg (170 lb)]   SpO2:  [95 %-99 %]      PHYSICAL EXAM:  Gen - NAD, in bed, awake, does not speak  Heart - RRR  Lungs - clear bilaterally, no wheezing  Abd - soft, no ttp, BS present  Back - multiple dressings over several wounds on back, right posterior thigh, sacrum  Skin - no rash    MEDS:    Current Facility-Administered Medications:     amLODIPine (Norvasc) tablet 5 mg, 5 mg, oral, Daily, Radha Potts MD    apixaban (Eliquis) tablet 5 mg, 5 mg, oral, q12h, Radha Potts MD    atorvastatin (Lipitor) tablet 40 mg, 40 mg, oral, q24h, Radha Potts MD    bisacodyl (Dulcolax) EC tablet 5 mg, 5 mg, oral, Daily PRN, Radha Potts MD    bisacodyl (Dulcolax) suppository 10 mg, 10 mg, rectal, Daily PRN, Radha Potts MD    bisacodyl (Dulcolax) suppository 10 mg, 10 mg, rectal, Once, Radha Potts MD    calcium carbonate-vitamin D3 500 mg-5 mcg (200 unit) per tablet 1 tablet, 1 tablet, oral, q12h LIBERTAD, Radha Potts MD    dextrose 5% infusion, 50 mL/hr, intravenous, Continuous, Radha Potts MD, Last Rate: 50 mL/hr at 08/29/24 0651, 50 mL/hr at 08/29/24 0651    donepezil (Aricept) tablet 10 mg, 10 mg, oral, Daily, Radha Potts MD    famotidine (Pepcid) tablet 10 mg, 10 mg, oral, q24h Del MAURER " Andrés, PharmSONALI    ferrous sulfate (325 mg ferrous sulfate) tablet 325 mg, 65 mg of iron, oral, BID, Radha Potts MD    folic acid (Folvite) tablet 1 mg, 1 mg, oral, Daily, Radha Potts MD    gabapentin (Neurontin) capsule 300 mg, 300 mg, oral, Daily, Radha Potts MD    hydrOXYzine HCL (Atarax) tablet 25 mg, 25 mg, oral, q8h PRN, Radha Potts MD    melatonin tablet 9 mg, 9 mg, oral, Nightly, Radha Potts MD    memantine (Namenda) tablet 10 mg, 10 mg, oral, q12h LIBERTAD, Radha Potts MD    montelukast (Singulair) tablet 10 mg, 10 mg, oral, Nightly, Radha Potts MD    morphine injection 1 mg, 1 mg, intravenous, q4h PRN, Radha Potts MD, 1 mg at 08/29/24 0713    nystatin (Mycostatin) 100,000 unit/mL suspension 500,000 Units, 5 mL, Swish & Swallow, 4x daily, Radha Potts MD    piperacillin-tazobactam (Zosyn) 2.25 g in dextrose (iso) IV 50 mL, 2.25 g, intravenous, q6h, Del Bowen, PharmD, 2.25 g at 08/29/24 0926    polyethylene glycol (Glycolax, Miralax) packet 17 g, 17 g, oral, BID, Radha Potts MD    sertraline (Zoloft) tablet 125 mg, 125 mg, oral, q AM, Radha Potts MD    sodium chloride 0.9 % bolus 1,000 mL, 1,000 mL, intravenous, Once, Luda Jacob MD, Last Rate: 999 mL/hr at 08/29/24 0928, 1,000 mL at 08/29/24 0928    traMADol (Ultram) tablet 50 mg, 50 mg, oral, q8h PRN, Radha Potts MD    traZODone (Desyrel) tablet 75 mg, 75 mg, oral, Nightly PRN, Radha Potts MD    vancomycin (Vancocin) pharmacy to dose - pharmacy monitoring, , miscellaneous, Daily PRN, Radha Potts MD     LABS:  Lab Results   Component Value Date    WBC 12.0 (H) 08/28/2024    HGB 10.4 (L) 08/28/2024    HCT 33.9 (L) 08/28/2024    MCV 89 08/28/2024     08/28/2024      Results from last 72 hours   Lab Units 08/29/24  0736   SODIUM mmol/L 150*   POTASSIUM mmol/L 3.9   CHLORIDE mmol/L 117*   CO2 mmol/L 26   BUN mg/dL 54*   CREATININE mg/dL 1.08*   GLUCOSE mg/dL 118*   CALCIUM mg/dL 8.4*   ANION GAP mmol/L 11   EGFR  mL/min/1.73m*2 53*     Results from last 72 hours   Lab Units 08/28/24  1324   ALK PHOS U/L 87   BILIRUBIN TOTAL mg/dL 0.5   PROTEIN TOTAL g/dL 7.2   ALT U/L 43   AST U/L 72*   ALBUMIN g/dL 2.8*     Estimated Creatinine Clearance: 43.2 mL/min (A) (by C-G formula based on SCr of 1.08 mg/dL (H)).    C-Reactive Protein   Date/Time Value Ref Range Status   08/28/2024 04:12 PM 13.36 (H) <1.00 mg/dL Final     Sedimentation Rate   Date/Time Value Ref Range Status   08/28/2024 02:51  (H) 0 - 30 mm/h Final        IMAGING:  CT head 8/28  Impression:     Significantly limited evaluation due to motion artifact. Within  constraints of imaging there is severe nonspecific white matter  changes and parenchymal volume loss, likely progressed from prior  imaging. No definite midline shift or significant acute intracranial  hemorrhage identified.     CT/PE 8/28  Impression:     No acute pulmonary embolism or other acute cardiopulmonary process.    Coronary artery calcifications.     CT A/P 8/28  Impression:     Nonunion of a right subcapital hip fracture which may be acute or  subacute. Age-indeterminate cortical mild set along the posterior  acetabulum. Clinical correlation suggested.    Sacral decubitus ulcer with minimal indeterminate osseous  irregularity of the adjacent coccyx. Correlation for osteomyelitis  suggested.    Significant stool burden through the colon and rectum consistent with  constipation. Impaction not excluded.       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 Wound care consultation.    Monitoring for adverse effects of abx such as rash/itching/diarrhea.    Will follow. Thanks! D/w RADHA Perdue MD  [facility]  Office #[phone]

## 2024-08-29 NOTE — PROGRESS NOTES
Pharmacy Medication History Review    Elida Perry is a 79 y.o. female admitted for Acute metabolic encephalopathy. Pharmacy reviewed the patient's yrpft-ln-qopqoccpw medications and allergies for accuracy.    The list below reflects the updated PTA list. Comments regarding how patient may be taking medications differently can be found in the Admit Orders Activity  Prior to Admission Medications   Prescriptions Last Dose Informant Patient Reported? Taking?   FeroSuL tablet Unknown Other Yes Yes   Sig: Take 1 tablet (325 mg) by mouth 2 times a day.   Floranex 1 million cell tablet tablet Unknown Other Yes Yes   Sig: Take 1 tablet by mouth once daily.   acetaminophen (TylenoL) 325 mg tablet Unknown Other Yes Yes   Sig: Take 1 tablet (325 mg) by mouth every 8 hours if needed (pain).   amLODIPine (Norvasc) 5 mg tablet Unknown Other Yes Yes   Sig: Take 1 tablet (5 mg) by mouth once daily.   apixaban (Eliquis) 5 mg tablet Unknown Other Yes Yes   Sig: Take 1 tablet (5 mg) by mouth every 12 hours.   atorvastatin (Lipitor) 40 mg tablet Unknown Other Yes Yes   Sig: Take 1 tablet (40 mg) by mouth once every 24 hours.   balsam peru-castor oiL (Venelex) ointment Unknown Other Yes Yes   Sig: Apply 1 Application topically 3 times a day. To bilateral heels   bisacodyl (Dulcolax) 10 mg suppository Unknown Other Yes Yes   Sig: Insert 1 suppository (10 mg) into the rectum once daily as needed for constipation.   calcium carbonate-vitamin D3 500 mg-5 mcg (200 unit) tablet Unknown Other Yes Yes   Sig: Take 1 tablet by mouth every 12 hours.   clobetasol (Temovate) 0.05 % cream Unknown Other Yes Yes   Sig: Apply 1 Application topically every 12 hours if needed (skin care/pemphigus).   diclofenac sodium (Voltaren) 1 % gel Unknown Other Yes Yes   Sig: Apply 4.5 inches (1 Application) topically 3 times a day.   donepezil (Aricept) 10 mg tablet Unknown Other Yes Yes   Sig: Take 1 tablet (10 mg) by mouth once daily.   famotidine (Pepcid) 10  mg tablet Unknown Other Yes Yes   Sig: Take 1 tablet (10 mg) by mouth every 12 hours.   folic acid (Folvite) 1 mg tablet Unknown Other Yes Yes   Sig: Take 1 tablet (1 mg) by mouth once daily.   gabapentin (Neurontin) 300 mg capsule Unknown Other Yes Yes   Sig: Take 1 capsule (300 mg) by mouth 2 times a day.   hydrOXYzine HCL (Atarax) 25 mg tablet Unknown Other Yes Yes   Sig: Take 1 tablet (25 mg) by mouth every 8 hours.   magnesium hydroxide (Milk of Magnesia) 400 mg/5 mL suspension Unknown Other Yes Yes   Sig: Take 30 mL by mouth once daily as needed for constipation.   melatonin 10 mg tablet Unknown Other Yes Yes   Sig: Take 1 tablet (10 mg) by mouth once daily at bedtime.   memantine (Namenda) 10 mg tablet Unknown Other Yes Yes   Sig: Take 1 tablet (10 mg) by mouth every 12 hours.   menthol-zinc ox-aloe-kirstin oil 0.45-20 % ointment Unknown Other Yes Yes   Sig: Apply 1 Application topically every 12 hours if needed (skin care/preventative).   montelukast (Singulair) 10 mg tablet Unknown Other Yes Yes   Sig: Take 1 tablet (10 mg) by mouth once daily at bedtime.   sertraline (Zoloft) 100 mg tablet Unknown Other Yes Yes   Sig: Take 1 tablet (100 mg) by mouth once daily in the morning.   sertraline (Zoloft) 25 mg tablet Unknown Other Yes Yes   Sig: Take 1 tablet (25 mg) by mouth once daily in the morning.   traMADol (Ultram) 50 mg tablet Unknown Other Yes Yes   Sig: Take 0.5 tablets (25 mg) by mouth 2 times a day. And every 6 hours PRN   traZODone (Desyrel) 50 mg tablet Unknown Other Yes Yes   Sig: Take 1.5 tablets (75 mg) by mouth once daily.   vancomycin (Firvanq) 50 mg/mL oral solution Unknown Other Yes Yes   Sig: Take 2.5 mL (125 mg) by mouth once daily. NO STOP DATE      Facility-Administered Medications: None        The list below reflects the updated allergy list. Please review each documented allergy for additional clarification and justification.  Allergies  Reviewed by Emma Vela RN on 8/28/2024   No  Known Allergies         Patient was unable to be assessed for M2B at discharge. Pharmacy has been updated to N/A - patient is from facility0.    Sources used to complete the med history include   [facility] Order Summary Report printed at [facility] 8/28/24 11:24:24 ET, faxed from [facility] ED    Medications ADDED:  All medications above added to chart  Medications CHANGED:  None   Medications REMOVED:   None    Below are additional concerns with the patient's PTA list.  No end date for vancomycin - life long therapy  Patient uses 2 strengths of sertraline for total daily dose of 75mg once daily    Krystina Burns, PharmD   Transitions of Care Pharmacist  [facility]  Please reach out via Secure Chat for questions, or if no response call [employer] or [facility]

## 2024-08-29 NOTE — CARE PLAN
The patient's goals for the shift include Pt. will have adequate relief of pain this shift    The clinical goals for the shift include Pt. will have a increase in blood pressure by end of shift      Problem: Pain - Adult  Goal: Verbalizes/displays adequate comfort level or baseline comfort level  Outcome: Progressing     Problem: Safety - Adult  Goal: Free from fall injury  Outcome: Progressing     Problem: Discharge Planning  Goal: Discharge to home or other facility with appropriate resources  Outcome: Progressing     Problem: Chronic Conditions and Co-morbidities  Goal: Patient's chronic conditions and co-morbidity symptoms are monitored and maintained or improved  Outcome: Progressing     Problem: Pain  Goal: Takes deep breaths with improved pain control throughout the shift  Outcome: Progressing  Goal: Turns in bed with improved pain control throughout the shift  Outcome: Progressing  Goal: Walks with improved pain control throughout the shift  Outcome: Progressing  Goal: Performs ADL's with improved pain control throughout shift  Outcome: Progressing  Goal: Participates in PT with improved pain control throughout the shift  Outcome: Progressing  Goal: Free from opioid side effects throughout the shift  Outcome: Progressing  Goal: Free from acute confusion related to pain meds throughout the shift  Outcome: Progressing

## 2024-08-29 NOTE — CONSULTS
Wound Care Consult     Visit Date: 8/29/2024      Patient Name: Elida Perry         MRN: [id number]           YOB: 1945     Reason for Consult: Multiple locations      Wound History: attempted to assess patient. Family at declined assessment due to patient's pain/discomfort and requested recommendations to be made from photos     Pertinent Labs:   Albumin   Date Value Ref Range Status   08/28/2024 2.8 (L) 3.4 - 5.0 g/dL Final   Wound Assessment:                     Wound Team Summary Assessment: Notified Dr. Jacob of wound care recommendations made from photos not assessment. Additional supplies left at bedside.     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  Apply no sting skin barrier (cavilon) to jose wound   Gently pack with Kerlix moistened with quarter strength Dakins solution  Cover with Mepilex border dressing   Change every day and as needed.     Apply Triad hydrophilic wound dressing ointment to buttocks/perineum/groin four times a day and as needed after incontinence.       Medial knee-Appears to be unstageable PI   Irrigate with normal saline or wound cleanser, Pat dry.  Apply no sting skin barrier (cavilon) to jose wound   Apply single layer of  xeroform dressing  Cover with Mepilex border dressing   Change every day and as needed.       Pad and protect bony prominences with mepilex bordered foam.   Peel back and assess area every shift and as needed. Change every 3 days and as needed if dislodged or soiled.      Wound Team Plan: Thank you for this consult. Assessment has been completed and orders have been placed. Wound care to be completed by nursing per orders. Please place new consult if there is a change in wound status.     While in bed patient should only be on one fitted sheet, and one chux. Please, do not use brief while patient is resting in bed. Elevate heels off the bed surface at all times. Turn and reposition at least every 2  hours.        Opal Cannon RN BSN,[facility]  [phone]   8/29/2024  6:45 PM

## 2024-08-29 NOTE — PROGRESS NOTES
Elida Perry is a 79 y.o. female on day 0 of admission presenting with Acute metabolic encephalopathy.       08/29/24 0656   Physical Activity   On average, how many days per week do you engage in moderate to strenuous exercise (like a brisk walk)? 0 days   On average, how many minutes do you engage in exercise at this level? 0 min   Financial Resource Strain   How hard is it for you to pay for the very basics like food, housing, medical care, and heating? Pt Unable   Housing Stability   In the last 12 months, was there a time when you were not able to pay the mortgage or rent on time? Pt Unable   In the past 12 months, how many times have you moved where you were living? 0   At any time in the past 12 months, were you homeless or living in a shelter (including now)? Pt Unable   Transportation Needs   In the past 12 months, has lack of transportation kept you from medical appointments or from getting medications? Pt Unable   In the past 12 months, has lack of transportation kept you from meetings, work, or from getting things needed for daily living? Pt Unable   Food Insecurity   Within the past 12 months, you worried that your food would run out before you got the money to buy more. Never true   Within the past 12 months, the food you bought just didn't last and you didn't have money to get more. Never true   Stress   Do you feel stress - tense, restless, nervous, or anxious, or unable to sleep at night because your mind is troubled all the time - these days? Pt Unable   Social Connections   In a typical week, how many times do you talk on the phone with family, friends, or neighbors? Pt Unable   How often do you get together with friends or relatives? Pt Unable   How often do you attend Methodist or Pentecostal services? Pt Unable   Do you belong to any clubs or organizations such as Methodist groups, unions, fraternal or athletic groups, or school groups? Pt Unable   How often do you attend meetings of the clubs or  organizations you belong to? Pt Unable   Are you , , , , never , or living with a partner?    Intimate Partner Violence   Within the last year, have you been afraid of your partner or ex-partner? Pt Unable   Within the last year, have you been humiliated or emotionally abused in other ways by your partner or ex-partner? Pt Unable   Within the last year, have you been kicked, hit, slapped, or otherwise physically hurt by your partner or ex-partner? Pt Unable   Within the last year, have you been raped or forced to have any kind of sexual activity by your partner or ex-partner? Pt Unable   Alcohol Use   Q1: How often do you have a drink containing alcohol? Never   Q2: How many drinks containing alcohol do you have on a typical day when you are drinking? None   Q3: How often do you have six or more drinks on one occasion? Never   Utilities   In the past 12 months has the electric, gas, oil, or water company threatened to shut off services in your home? No   Health Literacy   How often do you need to have someone help you when you read instructions, pamphlets, or other written material from your doctor or pharmacy? Always         Lana Little RN

## 2024-08-29 NOTE — PROGRESS NOTES
Elida Perry is a 79 y.o. female on day 0 of admission presenting with Acute metabolic encephalopathy.    Plan: admitted from [facility] where she has been at for encephalopathy, receiving IV abx, ID consulted.   Disposition: home with hospice  Barrier: iv abx, ID consult  ADOD:  2-3 days    915am   Spoke with patient's daughter at length to discuss DC planning. She feels she would like to look into hospice for patient at home because she feels the patient isn't comfortable any longer. Not happy with the care she has been receiving at [facility], and she has hired aides for her mother to be at the facility from 10am-6pm daily. She screams in pain with any movement being cleaned up, isn't into eating anymore, and does not have quality of life that she would want. She would like to meet with palliative care team to discuss what can be done to keep her mother comfortable. Became teary during conversation, and upset that patient is in pain and has this big wound now. Provided support and asked MD to place hospice order. Notified palliative care SW as well.    1338pm  Meeting with hospice [facility] and family scheduled between 5:30-6pm.     08/29/24 0657   Discharge Planning   Living Arrangements Alone   Support Systems Children;Family members   Assistance Needed total care   Type of Residence Nursing home/residential care   Do you have animals or pets at home? No   Who is requesting discharge planning? Provider   Home or Post Acute Services Post acute facilities (Rehab/SNF/etc)   Type of Post Acute Facility Services Long term care   Expected Discharge Disposition SNF  (re [facility])   Patient Choice   Provider Choice list and CMS website (https://medicare.gov/care-compare#search) for post-acute Quality and Resource Measure Data were provided and reviewed with: Family   Patient / Family choosing to utilize agency / facility established prior to hospitalization Yes       Lana Little RN

## 2024-08-29 NOTE — DISCHARGE INSTRUCTIONS
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  Apply no sting skin barrier (cavilon) to jose wound   Gently pack with Kerlix moistened with quarter strength Dakins solution  Cover with Mepilex border dressing   Change every day and as needed.     Apply Triad hydrophilic wound dressing ointment to buttocks/perineum/groin four times a day and as needed after incontinence.       Medial knee-Appears to be unstageable PI   Irrigate with normal saline or wound cleanser, Pat dry.  Apply no sting skin barrier (cavilon) to jose wound   Apply single layer of  xeroform dressing  Cover with Mepilex border dressing   Change every day and as needed.       Pad and protect bony prominences with mepilex bordered foam.   Peel back and assess area every shift and as needed. Change every 3 days and as needed if dislodged or soiled.      While in bed patient should only be on one fitted sheet, and one chux. Please, do not use brief while patient is resting in bed. Elevate heels off the bed surface at all times. Turn and reposition at least every 2 hours.

## 2024-08-29 NOTE — H&P
History Of Present Illness  Elida Perry is a 79 y.o. female presenting with AMS.  Patient is a very pleasant 79-year-old female who is nonverbal bedbound has dementia status post PEG tube placement then recent removal, sacral decubitus ulcer, hypertension and history of DVT.  Patient was brought in for altered mental status according to the family at bedside as well as on the phone stated since August 7 patient has been bedbound states her decubitus ulcer has gotten worse unhappy with the care at the nursing facility patient unable to provide any history at baseline she is alert oriented x 0 and bedbound.     Sodium 150 on labs and babar of 1.52 troponin 89  Past Medical History  She has a past medical history of Other conditions influencing health status (10/26/2015), Personal history of other mental and behavioral disorders (03/11/2019), Personal history of other specified conditions (05/28/2019), and Polyosteoarthritis, unspecified (07/05/2018).    Surgical History  She has a past surgical history that includes Hysterectomy (01/20/2014); Hand surgery (01/20/2014); Other surgical history (01/20/2014); Other surgical history (01/20/2014); Tonsillectomy (01/20/2014); Other surgical history (09/24/2021); Total knee arthroplasty (07/05/2018); IR  tube exchange (11/18/2020); and MR angio head wo IV contrast (8/9/2017).     Social History  She reports that she has never smoked. She has never used smokeless tobacco. No history on file for alcohol use and drug use.    Family History  No family history on file.     Allergies  Patient has no known allergies.    Review of Systems   Unable to perform ROS: Dementia        Physical Exam  Constitutional:       Comments: Lethargic, not verbal   HENT:      Nose: Nose normal.   Cardiovascular:      Rate and Rhythm: Normal rate and regular rhythm.   Pulmonary:      Effort: Pulmonary effort is normal.   Abdominal:      General: Abdomen is flat. Bowel sounds are normal.       Palpations: Abdomen is soft.   Skin:     General: Skin is warm.   Neurological:      Mental Status: Mental status is at baseline.          Last Recorded Vitals  /57 (BP Location: Left arm, Patient Position: Lying)   Pulse 86   Temp 36.4 °C (97.5 °F) (Temporal)   Resp 18   Wt 77.1 kg (170 lb)   SpO2 99%     Relevant Results  amLODIPine, 5 mg, oral, Daily  ampicillin-sulbactam, 3 g, intravenous, q6h  apixaban, 5 mg, oral, q12h  atorvastatin, 40 mg, oral, q24h  bisacodyl, 10 mg, rectal, Once  calcium carbonate-vitamin D3, 1 tablet, oral, q12h LIBERTAD  donepezil, 10 mg, oral, Daily  famotidine, 10 mg, oral, q24h LIBERTAD  ferrous sulfate (325 mg ferrous sulfate), 65 mg of iron, oral, BID  folic acid, 1 mg, oral, Daily  gabapentin, 300 mg, oral, Daily  melatonin, 9 mg, oral, Nightly  memantine, 10 mg, oral, q12h LIBERTAD  montelukast, 10 mg, oral, Nightly  nystatin, 5 mL, Swish & Swallow, 4x daily  polyethylene glycol, 17 g, oral, BID  sertraline, 125 mg, oral, q AM      Results for orders placed or performed during the hospital encounter of 08/28/24 (from the past 24 hour(s))   CBC and Auto Differential   Result Value Ref Range    WBC 12.0 (H) 4.4 - 11.3 x10*3/uL    nRBC 0.0 0.0 - 0.0 /100 WBCs    RBC 3.82 (L) 4.00 - 5.20 x10*6/uL    Hemoglobin 10.4 (L) 12.0 - 16.0 g/dL    Hematocrit 33.9 (L) 36.0 - 46.0 %    MCV 89 80 - 100 fL    MCH 27.2 26.0 - 34.0 pg    MCHC 30.7 (L) 32.0 - 36.0 g/dL    RDW 14.6 (H) 11.5 - 14.5 %    Platelets 178 150 - 450 x10*3/uL    Neutrophils % 74.5 40.0 - 80.0 %    Immature Granulocytes %, Automated 0.5 0.0 - 0.9 %    Lymphocytes % 14.4 13.0 - 44.0 %    Monocytes % 9.1 2.0 - 10.0 %    Eosinophils % 1.3 0.0 - 6.0 %    Basophils % 0.2 0.0 - 2.0 %    Neutrophils Absolute 8.93 (H) 1.60 - 5.50 x10*3/uL    Immature Granulocytes Absolute, Automated 0.06 0.00 - 0.50 x10*3/uL    Lymphocytes Absolute 1.73 0.80 - 3.00 x10*3/uL    Monocytes Absolute 1.09 (H) 0.05 - 0.80 x10*3/uL    Eosinophils Absolute 0.15  0.00 - 0.40 x10*3/uL    Basophils Absolute 0.02 0.00 - 0.10 x10*3/uL   Lactate   Result Value Ref Range    Lactate 1.7 0.4 - 2.0 mmol/L   Protime-INR   Result Value Ref Range    Protime 25.9 (H) 9.8 - 12.8 seconds    INR 2.3 (H) 0.9 - 1.1   Blood Culture    Specimen: Peripheral Venipuncture; Blood culture   Result Value Ref Range    Blood Culture Loaded on Instrument - Culture in progress    Blood Culture    Specimen: Peripheral Venipuncture; Blood culture   Result Value Ref Range    Blood Culture Loaded on Instrument - Culture in progress    Sars-CoV-2 PCR, Symptomatic   Result Value Ref Range    Coronavirus 2019, PCR Not Detected Not Detected   Blood Gas Venous Full Panel   Result Value Ref Range    POCT pH, Venous 7.43 7.33 - 7.43 pH    POCT pCO2, Venous 44 41 - 51 mm Hg    POCT pO2, Venous 60 (H) 35 - 45 mm Hg    POCT SO2, Venous 90 (H) 45 - 75 %    POCT Oxy Hemoglobin, Venous 87.6 (H) 45.0 - 75.0 %    POCT Hematocrit Calculated, Venous 35.0 (L) 36.0 - 46.0 %    POCT Sodium, Venous 151 (H) 136 - 145 mmol/L    POCT Potassium, Venous 3.8 3.5 - 5.3 mmol/L    POCT Chloride, Venous 117 (H) 98 - 107 mmol/L    POCT Ionized Calicum, Venous 1.31 1.10 - 1.33 mmol/L    POCT Glucose, Venous 134 (H) 74 - 99 mg/dL    POCT Lactate, Venous 1.8 0.4 - 2.0 mmol/L    POCT Base Excess, Venous 4.3 (H) -2.0 - 3.0 mmol/L    POCT HCO3 Calculated, Venous 29.2 (H) 22.0 - 26.0 mmol/L    POCT Hemoglobin, Venous 11.5 (L) 12.0 - 16.0 g/dL    POCT Anion Gap, Venous 9.0 (L) 10.0 - 25.0 mmol/L    Patient Temperature 37.0 degrees Celsius    FiO2 21 %   Sedimentation Rate   Result Value Ref Range    Sedimentation Rate 116 (H) 0 - 30 mm/h   Urinalysis with Reflex Culture and Microscopic   Result Value Ref Range    Color, Urine Light-Yellow Light-Yellow, Yellow, Dark-Yellow    Appearance, Urine Clear Clear    Specific Gravity, Urine 1.021 1.005 - 1.035    pH, Urine 5.0 5.0, 5.5, 6.0, 6.5, 7.0, 7.5, 8.0    Protein, Urine 10 (TRACE) NEGATIVE, 10  (TRACE), 20 (TRACE) mg/dL    Glucose, Urine Normal Normal mg/dL    Blood, Urine NEGATIVE NEGATIVE    Ketones, Urine NEGATIVE NEGATIVE mg/dL    Bilirubin, Urine NEGATIVE NEGATIVE    Urobilinogen, Urine Normal Normal mg/dL    Nitrite, Urine NEGATIVE NEGATIVE    Leukocyte Esterase, Urine NEGATIVE NEGATIVE   Extra Urine Gray Tube   Result Value Ref Range    Extra Tube Hold for add-ons.    Urinalysis Microscopic   Result Value Ref Range    WBC, Urine 1-5 1-5, NONE /HPF    RBC, Urine 1-2 NONE, 1-2, 3-5 /HPF    Squamous Epithelial Cells, Urine 1-9 (SPARSE) Reference range not established. /HPF    Bacteria, Urine 4+ (A) NONE SEEN /HPF    Mucus, Urine FEW Reference range not established. /LPF   Troponin I, High Sensitivity   Result Value Ref Range    Troponin I, High Sensitivity 70 (HH) 0 - 13 ng/L   C-Reactive Protein   Result Value Ref Range    C-Reactive Protein 13.36 (H) <1.00 mg/dL   Basic metabolic panel   Result Value Ref Range    Glucose 118 (H) 74 - 99 mg/dL    Sodium 150 (H) 136 - 145 mmol/L    Potassium 3.9 3.5 - 5.3 mmol/L    Chloride 117 (H) 98 - 107 mmol/L    Bicarbonate 26 21 - 32 mmol/L    Anion Gap 11 10 - 20 mmol/L    Urea Nitrogen 54 (H) 6 - 23 mg/dL    Creatinine 1.08 (H) 0.50 - 1.05 mg/dL    eGFR 53 (L) >60 mL/min/1.73m*2    Calcium 8.4 (L) 8.6 - 10.3 mg/dL   Lavender Top   Result Value Ref Range    Extra Tube Hold for add-ons.      Imaging:  Ct abd/pelvis:      IMPRESSION:  Nonunion of a right subcapital hip fracture which may be acute or  subacute. Age-indeterminate cortical mild set along the posterior  acetabulum. Clinical correlation suggested.      Sacral decubitus ulcer with minimal indeterminate osseous  irregularity of the adjacent coccyx. Correlation for osteomyelitis  suggested.      Significant stool burden through the colon and rectum consistent with  constipation. Impaction not excluded.    Ct chest:      IMPRESSION:  No acute pulmonary embolism or other acute cardiopulmonary process.       Coronary artery calcifications.     Head ct:  IMPRESSION:  Significantly limited evaluation due to motion artifact. Within  constraints of imaging there is severe nonspecific white matter  changes and parenchymal volume loss, likely progressed from prior  imaging. No definite midline shift or significant acute intracranial  hemorrhage identified.  Assessment/Plan   Assessment & Plan  Acute metabolic encephalopathy    1.  Dementia, nonverbal, bedbound   -Patient is mostly at her baseline although family saying since August 7 she has been unable to get out of bed prior to that she was getting up into the chair.  -Considering hospice hospice has been consulted  -Will consult nutrition for tube feeds patient does take a puréed diet which we will continue    2.  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  Hypernatremia  -continue iv dextrose recheck in am    4.  Acute kidney injury  -improving she is also on IV fluids.      Discussed care with family somewhere at bedside some on the phone, DNR/DNI for now okay with IV morphine for pain, they are leaning towards hospice.  But will meet with hospice this afternoon.         Luda Jacob MD

## 2024-08-29 NOTE — NURSING NOTE
RN Hospice Note    Elida Perry is a Hospice Patient.   Hospice terminal diagnosis: Dementia  Physician: Luda Jacob MD  Visit type: Information Only    Comments/recommendations:  Met with 5 of 7 children and many other family members. Discussed hospice support inpatient, at hospice house and at home. Belinda, primary cgr, is in agreement with hospice support and multiple family members agree with her. However, Eder, son, is having a difficult time with all of this. He wants his mother to be fed and receive fluids. He even was having difficulty with the thought of opiods being given to her. This RN attempted to refocus him on why we start to withdraw things such as IV fluids and not consider nutrition such as PEG tube. He was not realistic. Patient is having pain and Morphine 4mg is being given but she still grimaces and makes sounds with being moved. May need increased dose or Dilaudid instead (just a thought). Discussed move to hospice house and stopping IV fluids. For now we will make this info only. They are requesting palliative care NP call Belinda and set up a time later in the day tomorrow where there can be a discussion with the family about patient condition, and  questions answered. Explained that we can call daily or they can call when ready. Eder is just really having a rough time and feels that we are talking negative and he chooses to think positive. Its just really difficult sometimes to let go. She is being moved to 5th floor soon Im told.      Discharge Planning:  Patient to be discharged to  to be determined    The following is to be completed:  Discharge order:    State DNR signed by MD:    Nursing facility referral/transfer form:    Medication reconciliation:    PAS/RR or convalescent stay form:    Prescriptions for al narcotics/new medications:    Transportation:    Other:      Plan of care reviewed with patient/family members Belinda (dtr), Jessica (son), Tana (dtr), Eder (son),  Isaias (dtr) and multiple other family members present   Plan of care reviewed with hospital staff members: Luda Jacob MD,  Barry Cherry RN,  Ella RN and Fletcher RN    Please notify [facility] of any changes in condition. Thank you.  Office: [phone] (8 am-6:30 pm M-F and 8 am-4:30 pm weekends and holidays)   [phone] (6:30 pm-8 am M-F and 4:30 pm-8 am weekends and holidays)    Jacquelyn Figueredo RN

## 2024-08-29 NOTE — PROGRESS NOTES
PALLIATIVE CARE SOCIAL WORK NOTE     Hospice order received this morning. Met with pt's daughter Bridger Rosario ([phone]) at bedside. Daughter is quite distraught. It is very difficult for her to see her mother crying out in pain. Pt has been a long term care resident of [facility] since 2020. She does not want her to return to [facility], nor does she want to move her to a different facility. Her preference would be to take her to her home and to care for her there. She is familiar with hospice care and would like to involve hospice. Discussed what hospice care would look like in the home setting. Pt already has a private duty caregiver that she has been paying to be with her at [facility]. This caregiver would be with her at home as well. Bridger says that she has several friends who would also be able to help take care of her. As she has not lived at home in several years, there will be some DME needs which hospice will address. Offered choice of hospice agencies. Nicki familiar with [facility]. Referral has been made to [facility] via [facility]. [facility] to reach out to daughter to arrange a meeting for this afternoon. Will continue to follow as appropriate. Thank you.    LUCI Mustafa     Addendum 12:49 pm Family will be  meeting with [facility] this afternoon between 5:30-6:00pm    Addendum 4:32 pm Notified by daughter Bridger that pt has a son, Cosmo Perry, who is incarcerated at [facility]. Given the gravity of the situation, Bridger wanted to know about obtaining permission for son to speak to his mother by telephone. I spoke with someone at [facility] this afternoon. This can be arranged by calling [facility] and leaving a phone number. Correctional staff will then assist him to make the call.

## 2024-08-29 NOTE — PROGRESS NOTES
Emergency Medicine Transition of Care Note.    I received Elida Perry in signout from Dr. Avelar.  Please see the previous ED provider note for all HPI, PE and MDM up to the time of signout at 4:30 PM. This is in addition to the primary record.    In brief Elida Perry is an 79 y.o. female presenting for   Chief Complaint   Patient presents with    Altered Mental Status     At the time of signout we were awaiting: CT scan results    ED Course as of 08/29/24 0336   Wed Aug 28, 2024   1521 Rhythm monitor.  Sinus with PACs. [JM]      ED Course User Index  [JM] David Antoine MD         Diagnoses as of 08/29/24 0336   Acute metabolic encephalopathy   Sacral osteomyelitis (Multi)   THALIA (acute kidney injury) (CMS-HCC)   Elevated troponin   Hypernatremia       Medical Decision Making  CT chest showed no pulm embolism CT head negative nondiagnostic due to motion artifact CT abdomen pelvis confirms a sacral osteomyelitis, labs concerning for hyponatremia and acute renal failure patient was pancultured received IV antibiotics IV fluids and will be hospital for further care.,  ESR CRP elevated.    Final diagnoses:   [G93.41] Acute metabolic encephalopathy   [M46.28] Sacral osteomyelitis (Multi)   [N17.9] THALIA (acute kidney injury) (CMS-HCC)   [R79.89] Elevated troponin   [E87.0] Hypernatremia           Procedure  Procedures    Deon Joy MD

## 2024-08-29 NOTE — CONSULTS
Vancomycin Dosing by Pharmacy- INITIAL    Elida Perry is a 79 y.o. year old female who Pharmacy has been consulted for vancomycin dosing for cellulitis, skin and soft tissue. Based on the patient's indication and renal status this patient will be dosed based on a goal trough/random level of 10-15.     Renal function is currently declining- THALIA.    Visit Vitals  BP (!) 93/45   Pulse 78   Temp 36.5 °C (97.7 °F) (Tympanic)   Resp 18        Lab Results   Component Value Date    CREATININE 1.52 (H) 2024    CREATININE 0.57 2020    CREATININE 0.71 2020    CREATININE 1.01 2020    CREATININE 1.25 (H) 2020        Patient weight is as follows:   Vitals:    24 1239   Weight: 77.1 kg (170 lb)       Cultures:  No results found for the encounter in last 14 days.        No intake/output data recorded.  I/O during current shift:  I/O this shift:  In: 1000 [IV Piggyback:1000]  Out: -     Temp (24hrs), Av.5 °C (97.7 °F), Min:36.5 °C (97.7 °F), Max:36.5 °C (97.7 °F)         Assessment/Plan     Patient will be given a loading dose of 2000 mg  @ 1608.  Will initiate vancomycin maintenance- dosing by level. Vanco trough prior to giving additional vancomycin.    Follow-up level will be ordered on  @ 1400 labs unless clinically indicated sooner.  Will continue to monitor renal function daily while on vancomycin and order serum creatinine at least every 48 hours if not already ordered.  Follow for continued vancomycin needs, clinical response, and signs/symptoms of toxicity.       Del Bowen, PharmD

## 2024-08-30 LAB
ANION GAP SERPL CALC-SCNC: 10 MMOL/L (ref 10–20)
BUN SERPL-MCNC: 31 MG/DL (ref 6–23)
CALCIUM SERPL-MCNC: 8.1 MG/DL (ref 8.6–10.3)
CHLORIDE SERPL-SCNC: 116 MMOL/L (ref 98–107)
CO2 SERPL-SCNC: 27 MMOL/L (ref 21–32)
CREAT SERPL-MCNC: 0.8 MG/DL (ref 0.5–1.05)
EGFRCR SERPLBLD CKD-EPI 2021: 76 ML/MIN/1.73M*2
GLUCOSE SERPL-MCNC: 103 MG/DL (ref 74–99)
HOLD SPECIMEN: NORMAL
POTASSIUM SERPL-SCNC: 3.6 MMOL/L (ref 3.5–5.3)
SODIUM SERPL-SCNC: 149 MMOL/L (ref 136–145)

## 2024-08-30 PROCEDURE — 2500000004 HC RX 250 GENERAL PHARMACY W/ HCPCS (ALT 636 FOR OP/ED): Performed by: INTERNAL MEDICINE

## 2024-08-30 PROCEDURE — 99233 SBSQ HOSP IP/OBS HIGH 50: CPT | Performed by: INTERNAL MEDICINE

## 2024-08-30 PROCEDURE — 2500000001 HC RX 250 WO HCPCS SELF ADMINISTERED DRUGS (ALT 637 FOR MEDICARE OP): Performed by: INTERNAL MEDICINE

## 2024-08-30 PROCEDURE — 2500000001 HC RX 250 WO HCPCS SELF ADMINISTERED DRUGS (ALT 637 FOR MEDICARE OP): Performed by: HOSPITALIST

## 2024-08-30 PROCEDURE — 1100000001 HC PRIVATE ROOM DAILY

## 2024-08-30 PROCEDURE — 82374 ASSAY BLOOD CARBON DIOXIDE: CPT | Performed by: PHARMACIST

## 2024-08-30 PROCEDURE — 2500000004 HC RX 250 GENERAL PHARMACY W/ HCPCS (ALT 636 FOR OP/ED): Performed by: HOSPITALIST

## 2024-08-30 PROCEDURE — 2500000002 HC RX 250 W HCPCS SELF ADMINISTERED DRUGS (ALT 637 FOR MEDICARE OP, ALT 636 FOR OP/ED): Performed by: HOSPITALIST

## 2024-08-30 PROCEDURE — 36415 COLL VENOUS BLD VENIPUNCTURE: CPT | Performed by: PHARMACIST

## 2024-08-30 RX ORDER — MORPHINE SULFATE 2 MG/ML
2 INJECTION, SOLUTION INTRAMUSCULAR; INTRAVENOUS
Status: DISCONTINUED | OUTPATIENT
Start: 2024-08-30 | End: 2024-08-31

## 2024-08-30 RX ORDER — MORPHINE SULFATE 4 MG/ML
4 INJECTION, SOLUTION INTRAMUSCULAR; INTRAVENOUS
Status: DISCONTINUED | OUTPATIENT
Start: 2024-08-30 | End: 2024-08-31

## 2024-08-30 ASSESSMENT — PAIN SCALES - PAIN ASSESSMENT IN ADVANCED DEMENTIA (PAINAD)
BODYLANGUAGE: RIGID, FISTS CLENCHED, KNEES UP, PUSHING/PULLING AWAY, STRIKES OUT
TOTALSCORE: 0
FACIALEXPRESSION: FACIAL GRIMACING
BODYLANGUAGE: RELAXED
BREATHING: NORMAL
NEGVOCALIZATION: OCCASIONAL MOAN/GROAN, LOW SPEECH, NEGATIVE/DISAPPROVING QUALITY
FACIALEXPRESSION: SMILING OR INEXPRESSIVE
BREATHING: OCCASIONAL LABORED BREATHING, SHORT PERIOD OF HYPERVENTILATION
TOTALSCORE: MEDICATION (SEE MAR)
CONSOLABILITY: NO NEED TO CONSOLE

## 2024-08-30 ASSESSMENT — COGNITIVE AND FUNCTIONAL STATUS - GENERAL
MOVING TO AND FROM BED TO CHAIR: TOTAL
CLIMB 3 TO 5 STEPS WITH RAILING: TOTAL
WALKING IN HOSPITAL ROOM: TOTAL
EATING MEALS: TOTAL
PERSONAL GROOMING: TOTAL
TURNING FROM BACK TO SIDE WHILE IN FLAT BAD: TOTAL
DRESSING REGULAR UPPER BODY CLOTHING: TOTAL
PERSONAL GROOMING: TOTAL
STANDING UP FROM CHAIR USING ARMS: TOTAL
TOILETING: TOTAL
DRESSING REGULAR LOWER BODY CLOTHING: TOTAL
HELP NEEDED FOR BATHING: TOTAL
TOILETING: TOTAL
DAILY ACTIVITIY SCORE: 6
MOVING FROM LYING ON BACK TO SITTING ON SIDE OF FLAT BED WITH BEDRAILS: TOTAL
TURNING FROM BACK TO SIDE WHILE IN FLAT BAD: TOTAL
MOBILITY SCORE: 6
DAILY ACTIVITIY SCORE: 6
WALKING IN HOSPITAL ROOM: TOTAL
MOVING TO AND FROM BED TO CHAIR: TOTAL
STANDING UP FROM CHAIR USING ARMS: TOTAL
MOVING FROM LYING ON BACK TO SITTING ON SIDE OF FLAT BED WITH BEDRAILS: TOTAL
HELP NEEDED FOR BATHING: TOTAL
CLIMB 3 TO 5 STEPS WITH RAILING: TOTAL
DRESSING REGULAR LOWER BODY CLOTHING: TOTAL
MOBILITY SCORE: 6
EATING MEALS: TOTAL
DRESSING REGULAR UPPER BODY CLOTHING: TOTAL

## 2024-08-30 ASSESSMENT — PAIN SCALES - WONG BAKER
WONGBAKER_NUMERICALRESPONSE: HURTS EVEN MORE
WONGBAKER_NUMERICALRESPONSE: NO HURT
WONGBAKER_NUMERICALRESPONSE: NO HURT

## 2024-08-30 ASSESSMENT — PAIN - FUNCTIONAL ASSESSMENT
PAIN_FUNCTIONAL_ASSESSMENT: UNABLE TO SELF-REPORT
PAIN_FUNCTIONAL_ASSESSMENT: UNABLE TO SELF-REPORT

## 2024-08-30 NOTE — PROGRESS NOTES
24 0731   Current Planned Discharge Disposition   Current Planned Discharge Disposition [facility]     Per notes once med ready plan would be to discharge possibly with hospice care, either discharging to the Hospice home or discharging home with hospice care, per notes family did meet with [facility] and had a meeting in regards to discharge plan, family is requesting pal care as they still have questions needing answered before they can make a decision. I will continue to monitor for discharge planning.    08:07 per previous notes patient has a son who is incarcerated at [facility] and he would like to talk to his mother, per notes from Hospice it would be best for him to do this once she is home, facility phone # [phone], and they would need is information , which was also provided to the [facility] in [facility].  Number  [id number]    06/15/1987  Gender  Male  Race  Black  Admission Date  2017  Institution  [facility]  Status  INCARCERATED  Address [address] [place], [place] [id number]

## 2024-08-30 NOTE — CARE PLAN
Problem: Skin  Goal: Participates in plan/prevention/treatment measures  8/29/2024 2341 by Esther Will RN  Outcome: Progressing  Flowsheets (Taken 8/29/2024 0945 by Barry Cherry RN)  Participates in plan/prevention/treatment measures: Discuss with provider PT/OT consult  8/29/2024 2341 by Esther Will RN  Outcome: Progressing     Problem: Skin  Goal: Decreased wound size/increased tissue granulation at next dressing change  8/29/2024 2341 by Esther Will RN  Outcome: Progressing  Flowsheets (Taken 8/29/2024 0650 by Mónica Garza RN)  Decreased wound size/increased tissue granulation at next dressing change:   Promote sleep for wound healing   Protective dressings over bony prominences  8/29/2024 2341 by Esther Will RN  Outcome: Progressing  Goal: Participates in plan/prevention/treatment measures  8/29/2024 2341 by Esther Will RN  Outcome: Progressing  Flowsheets (Taken 8/29/2024 0945 by Barry Cherry RN)  Participates in plan/prevention/treatment measures: Discuss with provider PT/OT consult  8/29/2024 2341 by Esther Will RN  Outcome: Progressing  Goal: Prevent/manage excess moisture  8/29/2024 2341 by Esther Will RN  Outcome: Progressing  Flowsheets (Taken 8/29/2024 0945 by Barry Cherry RN)  Prevent/manage excess moisture: Cleanse incontinence/protect with barrier cream  8/29/2024 2341 by Esther Will RN  Outcome: Progressing  Goal: Prevent/minimize sheer/friction injuries  8/29/2024 2341 by Esther Will RN  Outcome: Progressing  Flowsheets (Taken 8/29/2024 0945 by Barry Cherry RN)  Prevent/minimize sheer/friction injuries: Turn/reposition every 2 hours/use positioning/transfer devices  8/29/2024 2341 by Esther Will RN  Outcome: Progressing  Goal: Promote/optimize nutrition  8/29/2024 2341 by Esther Will RN  Outcome: Progressing  Flowsheets (Taken 8/29/2024 0945 by Barry Cherry RN)  Promote/optimize nutrition: Assist with feeding  8/29/2024 2341 by  Esther Will RN  Outcome: Progressing  Goal: Promote skin healing  8/29/2024 2341 by Esther Will RN  Outcome: Progressing  Flowsheets (Taken 8/29/2024 0945 by Barry Cherry RN)  Promote skin healing: Assess skin/pad under line(s)/device(s)  8/29/2024 2341 by Esther Will RN  Outcome: Progressing   The patient's goals for the shift include Pt. will have adequate relief of pain this shift    The clinical goals for the shift include pt safety will be maintained in duration of the shift.    Over the shift, the patient did make progress toward the following goals.

## 2024-08-30 NOTE — CARE PLAN
Problem: Pain - Adult  Goal: Verbalizes/displays adequate comfort level or baseline comfort level  Outcome: Progressing     Problem: Safety - Adult  Goal: Free from fall injury  Outcome: Progressing     Problem: Discharge Planning  Goal: Discharge to home or other facility with appropriate resources  Outcome: Progressing     Problem: Chronic Conditions and Co-morbidities  Goal: Patient's chronic conditions and co-morbidity symptoms are monitored and maintained or improved  Outcome: Progressing     Problem: Pain  Goal: Takes deep breaths with improved pain control throughout the shift  Outcome: Progressing  Goal: Turns in bed with improved pain control throughout the shift  Outcome: Progressing  Goal: Walks with improved pain control throughout the shift  Outcome: Progressing  Goal: Performs ADL's with improved pain control throughout shift  Outcome: Progressing  Goal: Participates in PT with improved pain control throughout the shift  Outcome: Progressing  Goal: Free from opioid side effects throughout the shift  Outcome: Progressing  Goal: Free from acute confusion related to pain meds throughout the shift  Outcome: Progressing     Problem: Skin  Goal: Decreased wound size/increased tissue granulation at next dressing change  Outcome: Progressing  Flowsheets (Taken 8/30/2024 0147 by Esther Will RN)  Decreased wound size/increased tissue granulation at next dressing change: Protective dressings over bony prominences  Goal: Participates in plan/prevention/treatment measures  Outcome: Progressing  Flowsheets (Taken 8/30/2024 0147 by Esther Will, RN)  Participates in plan/prevention/treatment measures: Elevate heels  Goal: Prevent/manage excess moisture  Outcome: Progressing  Flowsheets (Taken 8/30/2024 0147 by Esther Will, RN)  Prevent/manage excess moisture:   Moisturize dry skin   Monitor for/manage infection if present   Follow provider orders for dressing changes  Goal: Prevent/minimize sheer/friction  injuries  Outcome: Progressing  Flowsheets (Taken 8/30/2024 1301)  Prevent/minimize sheer/friction injuries: HOB 30 degrees or less  Goal: Promote/optimize nutrition  Outcome: Progressing  Flowsheets (Taken 8/30/2024 1301)  Promote/optimize nutrition: Consume > 50% meals/supplements  Goal: Promote skin healing  Outcome: Progressing  Flowsheets (Taken 8/30/2024 1301)  Promote skin healing: Assess skin/pad under line(s)/device(s)     Problem: Fall/Injury  Goal: Not fall by end of shift  Outcome: Progressing  Goal: Be free from injury by end of the shift  Outcome: Progressing  Goal: Verbalize understanding of personal risk factors for fall in the hospital  Outcome: Progressing  Goal: Verbalize understanding of risk factor reduction measures to prevent injury from fall in the home  Outcome: Progressing  Goal: Use assistive devices by end of the shift  Outcome: Progressing  Goal: Pace activities to prevent fatigue by end of the shift  Outcome: Progressing   The patient's goals for the shift include pain management and comfort care    The clinical goals for the shift include pt safety will be maintained in duration of the shift.

## 2024-08-30 NOTE — PROGRESS NOTES
"Elida Perry is a 79 y.o. female on day 1 of admission presenting with Acute metabolic encephalopathy.    Subjective   Patient is still grimacing in pain therefore I increased morphine frequency to every 3 hours, discussed with family at bedside they plan on signing with hospice they are debating if patient should be made GIP at [facility] versus  facility.  I did increase patient's dextrose fluids to 100 cc an hour as sodium did not improve much it was only down to 149.       Objective     Physical Exam  Vitals reviewed.   Constitutional:       Comments: Somonlent     HENT:      Head: Normocephalic.      Nose: Nose normal.      Mouth/Throat:      Mouth: Mucous membranes are moist.   Cardiovascular:      Rate and Rhythm: Normal rate and regular rhythm.   Pulmonary:      Effort: Pulmonary effort is normal.   Abdominal:      General: Abdomen is flat. Bowel sounds are normal.      Palpations: Abdomen is soft.   Skin:     Capillary Refill: Capillary refill takes less than 2 seconds.   Neurological:      General: No focal deficit present.         Last Recorded Vitals  Blood pressure 106/53, pulse 76, temperature 36.8 °C (98.2 °F), temperature source Temporal, resp. rate 18, height 1.626 m (5' 4\"), weight 77.1 kg (170 lb), SpO2 98%.  Intake/Output last 3 Shifts:  I/O last 3 completed shifts:  In: 2802.5 (36.3 mL/kg) [I.V.:602.5 (7.8 mL/kg); IV Piggyback:2200]  Out: 330 (4.3 mL/kg) [Urine:330 (0.1 mL/kg/hr)]  Weight: 77.1 kg     Relevant Results  Results for orders placed or performed during the hospital encounter of 08/28/24 (from the past 24 hour(s))   Lavender Top   Result Value Ref Range    Extra Tube Hold for add-ons.    Basic Metabolic Panel   Result Value Ref Range    Glucose 103 (H) 74 - 99 mg/dL    Sodium 149 (H) 136 - 145 mmol/L    Potassium 3.6 3.5 - 5.3 mmol/L    Chloride 116 (H) 98 - 107 mmol/L    Bicarbonate 27 21 - 32 mmol/L    Anion Gap 10 10 - 20 mmol/L    Urea Nitrogen 31 (H) 6 - 23 mg/dL    Creatinine 0.80 " 0.50 - 1.05 mg/dL    eGFR 76 >60 mL/min/1.73m*2    Calcium 8.1 (L) 8.6 - 10.3 mg/dL       Imaging Results    Ct abd/pelvis:      IMPRESSION:  Nonunion of a right subcapital hip fracture which may be acute or  subacute. Age-indeterminate cortical mild set along the posterior  acetabulum. Clinical correlation suggested.      Sacral decubitus ulcer with minimal indeterminate osseous  irregularity of the adjacent coccyx. Correlation for osteomyelitis  suggested.      Significant stool burden through the colon and rectum consistent with  constipation. Impaction not excluded.     Ct chest:      IMPRESSION:  No acute pulmonary embolism or other acute cardiopulmonary process.      Coronary artery calcifications.      Head ct:  IMPRESSION:  Significantly limited evaluation due to motion artifact. Within  constraints of imaging there is severe nonspecific white matter  changes and parenchymal volume loss, likely progressed from prior  imaging. No definite midline shift or significant acute intracranial  hemorrhage identified.        Medications:  amLODIPine, 5 mg, oral, Daily  ampicillin-sulbactam, 3 g, intravenous, q6h  apixaban, 5 mg, oral, q12h  atorvastatin, 40 mg, oral, q24h  bisacodyl, 10 mg, rectal, Once  calcium carbonate-vitamin D3, 1 tablet, oral, q12h LIBERTAD  donepezil, 10 mg, oral, Daily  famotidine, 10 mg, oral, q24h LIBERTAD  ferrous sulfate (325 mg ferrous sulfate), 65 mg of iron, oral, BID  folic acid, 1 mg, oral, Daily  gabapentin, 300 mg, oral, Daily  melatonin, 9 mg, oral, Nightly  memantine, 10 mg, oral, q12h LIBERTAD  montelukast, 10 mg, oral, Nightly  nystatin, 5 mL, Swish & Swallow, 4x daily  polyethylene glycol, 17 g, oral, BID  sertraline, 125 mg, oral, q AM  sodium hypochlorite, , irrigation, Daily       PRN medications: bisacodyl, bisacodyl, hydrOXYzine HCL, morphine, morphine, traZODone     Assessment/Plan     1.  Dementia, nonverbal, bedbound   -Patient is mostly at her baseline although family saying since  August 7 she has been unable to get out of bed prior to that she was getting up into the chair.  -Likely will go the hospice route but deciding if patient will go to a facility for hospice versus GIP  -IV morphine for pain changed frequency to every 3 hours     2.  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  Hypernatremia  -Much improvement in sodium increase dextrose to 100 cc an hour, sodium improved down to 149     4.  Acute kidney injury  -improving she is also on IV fluids.    5. NSTEMI type II  -hold off on further work up    Updated family at bedside  DVT Prophylaxis:  LASHAY Jacob MD  [facility]

## 2024-08-30 NOTE — PROGRESS NOTES
"  INFECTIOUS DISEASE DAILY PROGRESS NOTE    SUBJECTIVE:    No overnight events. No interval history obtainable from patient as is non-verbal. Afebrile. No rash/itching/diarrhea.    OBJECTIVE:  VITALS (Last 24 Hours)  /61 (BP Location: Left arm, Patient Position: Lying)   Pulse 78   Temp 36.7 °C (98 °F) (Temporal)   Resp 18   Ht 1.626 m (5' 4\")   Wt 77.1 kg (170 lb)   SpO2 96%   BMI 29.18 kg/m²     PHYSICAL EXAM:  Gen - NAD, laying in bed  Lungs - no wheezing  Abd - soft, no ttp, BS present  Back - multiple dressings over several wounds on back, right posterior thigh, sacrum  Skin - no rash    ABX: IV Unasyn    LABS:  Lab Results   Component Value Date    WBC 12.0 (H) 08/28/2024    HGB 10.4 (L) 08/28/2024    HCT 33.9 (L) 08/28/2024    MCV 89 08/28/2024     08/28/2024     Lab Results   Component Value Date    GLUCOSE 118 (H) 08/29/2024    CALCIUM 8.4 (L) 08/29/2024     (H) 08/29/2024    K 3.9 08/29/2024    CO2 26 08/29/2024     (H) 08/29/2024    BUN 54 (H) 08/29/2024    CREATININE 1.08 (H) 08/29/2024     Results from last 72 hours   Lab Units 08/28/24  1324   ALK PHOS U/L 87   BILIRUBIN TOTAL mg/dL 0.5   PROTEIN TOTAL g/dL 7.2   ALT U/L 43   AST U/L 72*   ALBUMIN g/dL 2.8*     Estimated Creatinine Clearance: 43.2 mL/min (A) (by C-G formula based on SCr of 1.08 mg/dL (H)).    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 Wound care following.    I am not certain if treating for OM will be particularly beneficial. Risk of prolonged IV abx may outweigh benefit. Will continue on IV abx for the weekend for now and observe further.     Monitoring for adverse effects of abx such as rash/itching/diarrhea.    Will follow peripherally over the weekend. Please call Dr. Sanchez with questions. Thanks!    Viktor Perdue MD  [facility]  Office #[phone]      "

## 2024-08-30 NOTE — CARE PLAN
Problem: Pain - Adult  Goal: Verbalizes/displays adequate comfort level or baseline comfort level  Outcome: Progressing     Problem: Skin  Goal: Decreased wound size/increased tissue granulation at next dressing change  8/30/2024 0147 by Esther Will RN  Outcome: Progressing  Flowsheets (Taken 8/30/2024 0147)  Decreased wound size/increased tissue granulation at next dressing change: Protective dressings over bony prominences  8/29/2024 2341 by Esther Will RN  Outcome: Progressing  Flowsheets (Taken 8/29/2024 0650 by Mónica Garza RN)  Decreased wound size/increased tissue granulation at next dressing change:   Promote sleep for wound healing   Protective dressings over bony prominences  8/29/2024 2341 by Esther Will RN  Outcome: Progressing  Goal: Participates in plan/prevention/treatment measures  8/30/2024 0147 by Esther Will RN  Outcome: Progressing  Flowsheets (Taken 8/30/2024 0147)  Participates in plan/prevention/treatment measures: Elevate heels  8/29/2024 2341 by Esther Will RN  Outcome: Progressing  Flowsheets (Taken 8/29/2024 0945 by Barry Cherry RN)  Participates in plan/prevention/treatment measures: Discuss with provider PT/OT consult  8/29/2024 2341 by Esther Will RN  Outcome: Progressing  Goal: Prevent/manage excess moisture  8/30/2024 0147 by Esther iWll RN  Outcome: Progressing  Flowsheets (Taken 8/30/2024 0147)  Prevent/manage excess moisture:   Moisturize dry skin   Monitor for/manage infection if present   Follow provider orders for dressing changes  8/29/2024 2341 by Esther Will RN  Outcome: Progressing  Flowsheets (Taken 8/29/2024 0945 by Barry Cherry RN)  Prevent/manage excess moisture: Cleanse incontinence/protect with barrier cream  8/29/2024 2341 by Esther Will RN  Outcome: Progressing  Goal: Prevent/minimize sheer/friction injuries  8/30/2024 0147 by Esther Will RN  Outcome: Progressing  Flowsheets (Taken 8/30/2024 0147)  Prevent/minimize  sheer/friction injuries: Turn/reposition every 2 hours/use positioning/transfer devices  8/29/2024 2341 by Esther Will RN  Outcome: Progressing  Flowsheets (Taken 8/29/2024 0945 by Barry Cherry RN)  Prevent/minimize sheer/friction injuries: Turn/reposition every 2 hours/use positioning/transfer devices  8/29/2024 2341 by Esther Will RN  Outcome: Progressing  Goal: Promote/optimize nutrition  8/30/2024 0147 by Esther Will RN  Outcome: Progressing  Flowsheets (Taken 8/30/2024 0147)  Promote/optimize nutrition: Consume > 50% meals/supplements  8/29/2024 2341 by Esther Will RN  Outcome: Progressing  Flowsheets (Taken 8/29/2024 0945 by Barry Cherry RN)  Promote/optimize nutrition: Assist with feeding  8/29/2024 2341 by Esther Will RN  Outcome: Progressing  Goal: Promote skin healing  8/30/2024 0147 by Esther Will RN  Outcome: Progressing  Flowsheets (Taken 8/30/2024 0147)  Promote skin healing: Turn/reposition every 2 hours/use positioning/transfer devices  8/29/2024 2341 by Esther Will RN  Outcome: Progressing  Flowsheets (Taken 8/29/2024 0945 by Barry Cherry RN)  Promote skin healing: Assess skin/pad under line(s)/device(s)  8/29/2024 2341 by Esther Will RN  Outcome: Progressing   The patient's goals for the shift include Pt. will have adequate relief of pain this shift    The clinical goals for the shift include pt safety will be maintained in duration of the shift.    Over the shift, the patient did  make progress toward the following goals.

## 2024-08-30 NOTE — CARE PLAN
Problem: Pain - Adult  Goal: Verbalizes/displays adequate comfort level or baseline comfort level  Outcome: Progressing   The patient's goals for the shift include Pt. will have adequate relief of pain this shift    The clinical goals for the shift include pt safety will be maintained in duration of the shift.    Over the shift, the patient did make progress toward the following goals.

## 2024-08-31 LAB
ANION GAP SERPL CALC-SCNC: 11 MMOL/L (ref 10–20)
BUN SERPL-MCNC: 15 MG/DL (ref 6–23)
CALCIUM SERPL-MCNC: 6.9 MG/DL (ref 8.6–10.3)
CHLORIDE SERPL-SCNC: 101 MMOL/L (ref 98–107)
CO2 SERPL-SCNC: 24 MMOL/L (ref 21–32)
CREAT SERPL-MCNC: 0.63 MG/DL (ref 0.5–1.05)
EGFRCR SERPLBLD CKD-EPI 2021: >90 ML/MIN/1.73M*2
GLUCOSE BLD MANUAL STRIP-MCNC: 102 MG/DL (ref 74–99)
GLUCOSE SERPL-MCNC: 470 MG/DL (ref 74–99)
POTASSIUM SERPL-SCNC: 2.8 MMOL/L (ref 3.5–5.3)
SODIUM SERPL-SCNC: 133 MMOL/L (ref 136–145)

## 2024-08-31 PROCEDURE — 1100000001 HC PRIVATE ROOM DAILY

## 2024-08-31 PROCEDURE — 99232 SBSQ HOSP IP/OBS MODERATE 35: CPT | Performed by: INTERNAL MEDICINE

## 2024-08-31 PROCEDURE — 76937 US GUIDE VASCULAR ACCESS: CPT

## 2024-08-31 PROCEDURE — 2500000004 HC RX 250 GENERAL PHARMACY W/ HCPCS (ALT 636 FOR OP/ED): Performed by: INTERNAL MEDICINE

## 2024-08-31 PROCEDURE — 83735 ASSAY OF MAGNESIUM: CPT | Performed by: NURSE PRACTITIONER

## 2024-08-31 PROCEDURE — 2500000001 HC RX 250 WO HCPCS SELF ADMINISTERED DRUGS (ALT 637 FOR MEDICARE OP): Performed by: PHARMACIST

## 2024-08-31 PROCEDURE — 2500000002 HC RX 250 W HCPCS SELF ADMINISTERED DRUGS (ALT 637 FOR MEDICARE OP, ALT 636 FOR OP/ED): Performed by: INTERNAL MEDICINE

## 2024-08-31 PROCEDURE — 2500000001 HC RX 250 WO HCPCS SELF ADMINISTERED DRUGS (ALT 637 FOR MEDICARE OP): Performed by: INTERNAL MEDICINE

## 2024-08-31 PROCEDURE — 2500000001 HC RX 250 WO HCPCS SELF ADMINISTERED DRUGS (ALT 637 FOR MEDICARE OP): Performed by: HOSPITALIST

## 2024-08-31 PROCEDURE — 2500000002 HC RX 250 W HCPCS SELF ADMINISTERED DRUGS (ALT 637 FOR MEDICARE OP, ALT 636 FOR OP/ED): Performed by: HOSPITALIST

## 2024-08-31 PROCEDURE — 2500000004 HC RX 250 GENERAL PHARMACY W/ HCPCS (ALT 636 FOR OP/ED): Performed by: HOSPITALIST

## 2024-08-31 PROCEDURE — 82947 ASSAY GLUCOSE BLOOD QUANT: CPT

## 2024-08-31 PROCEDURE — 80048 BASIC METABOLIC PNL TOTAL CA: CPT | Performed by: INTERNAL MEDICINE

## 2024-08-31 RX ORDER — MORPHINE SULFATE 20 MG/ML
20 SOLUTION ORAL
Status: DISCONTINUED | OUTPATIENT
Start: 2024-08-31 | End: 2024-09-01

## 2024-08-31 RX ORDER — INSULIN LISPRO 100 [IU]/ML
4 INJECTION, SOLUTION INTRAVENOUS; SUBCUTANEOUS ONCE
Status: COMPLETED | OUTPATIENT
Start: 2024-08-31 | End: 2024-08-31

## 2024-08-31 RX ORDER — POTASSIUM CHLORIDE 14.9 MG/ML
20 INJECTION INTRAVENOUS
Status: DISPENSED | OUTPATIENT
Start: 2024-08-31 | End: 2024-08-31

## 2024-08-31 ASSESSMENT — COGNITIVE AND FUNCTIONAL STATUS - GENERAL
MOVING FROM LYING ON BACK TO SITTING ON SIDE OF FLAT BED WITH BEDRAILS: TOTAL
DRESSING REGULAR UPPER BODY CLOTHING: TOTAL
MOVING TO AND FROM BED TO CHAIR: TOTAL
DAILY ACTIVITIY SCORE: 6
CLIMB 3 TO 5 STEPS WITH RAILING: TOTAL
WALKING IN HOSPITAL ROOM: TOTAL
HELP NEEDED FOR BATHING: TOTAL
DRESSING REGULAR LOWER BODY CLOTHING: TOTAL
EATING MEALS: TOTAL
TURNING FROM BACK TO SIDE WHILE IN FLAT BAD: TOTAL
MOBILITY SCORE: 6
STANDING UP FROM CHAIR USING ARMS: TOTAL
PERSONAL GROOMING: TOTAL
TOILETING: TOTAL

## 2024-08-31 ASSESSMENT — PAIN SCALES - WONG BAKER
WONGBAKER_NUMERICALRESPONSE: HURTS LITTLE MORE
WONGBAKER_NUMERICALRESPONSE: HURTS LITTLE MORE

## 2024-08-31 NOTE — PROGRESS NOTES
"Elida Perry is a 79 y.o. female on day 2 of admission presenting with Acute metabolic encephalopathy.    Subjective   Stated family again at bedside, sodium normal, low potassium which was repleted patient sugars over 400 likely secondary to IV dextrose will decrease IV dextrose down to 50 cc an hour, did give one-time dose of insulin lispro.  Family still deciding if they would like to go with hospice       Objective     Physical Exam  Vitals reviewed.   Constitutional:       Comments: Somonlent     HENT:      Head: Normocephalic.      Nose: Nose normal.      Mouth/Throat:      Mouth: Mucous membranes are moist.   Cardiovascular:      Rate and Rhythm: Normal rate and regular rhythm.   Pulmonary:      Effort: Pulmonary effort is normal.   Abdominal:      General: Abdomen is flat. Bowel sounds are normal.      Palpations: Abdomen is soft.   Skin:     Capillary Refill: Capillary refill takes less than 2 seconds.   Neurological:      General: No focal deficit present.         Last Recorded Vitals  Blood pressure 106/50, pulse 71, temperature 36.8 °C (98.3 °F), temperature source Temporal, resp. rate 17, height 1.626 m (5' 4\"), weight 77.1 kg (170 lb), SpO2 93%.  Intake/Output last 3 Shifts:  I/O last 3 completed shifts:  In: 100 (1.3 mL/kg) [IV Piggyback:100]  Out: 830 (10.8 mL/kg) [Urine:830 (0.3 mL/kg/hr)]  Weight: 77.1 kg     Relevant Results  Results for orders placed or performed during the hospital encounter of 08/28/24 (from the past 24 hour(s))   Basic Metabolic Panel   Result Value Ref Range    Glucose 470 (HH) 74 - 99 mg/dL    Sodium 133 (L) 136 - 145 mmol/L    Potassium 2.8 (LL) 3.5 - 5.3 mmol/L    Chloride 101 98 - 107 mmol/L    Bicarbonate 24 21 - 32 mmol/L    Anion Gap 11 10 - 20 mmol/L    Urea Nitrogen 15 6 - 23 mg/dL    Creatinine 0.63 0.50 - 1.05 mg/dL    eGFR >90 >60 mL/min/1.73m*2    Calcium 6.9 (L) 8.6 - 10.3 mg/dL       Imaging Results    Ct abd/pelvis:      IMPRESSION:  Nonunion of a right " subcapital hip fracture which may be acute or  subacute. Age-indeterminate cortical mild set along the posterior  acetabulum. Clinical correlation suggested.      Sacral decubitus ulcer with minimal indeterminate osseous  irregularity of the adjacent coccyx. Correlation for osteomyelitis  suggested.      Significant stool burden through the colon and rectum consistent with  constipation. Impaction not excluded.     Ct chest:      IMPRESSION:  No acute pulmonary embolism or other acute cardiopulmonary process.      Coronary artery calcifications.      Head ct:  IMPRESSION:  Significantly limited evaluation due to motion artifact. Within  constraints of imaging there is severe nonspecific white matter  changes and parenchymal volume loss, likely progressed from prior  imaging. No definite midline shift or significant acute intracranial  hemorrhage identified.        Medications:  amLODIPine, 5 mg, oral, Daily  ampicillin-sulbactam, 3 g, intravenous, q6h  apixaban, 5 mg, oral, q12h  atorvastatin, 40 mg, oral, q24h  bisacodyl, 10 mg, rectal, Once  calcium carbonate-vitamin D3, 1 tablet, oral, q12h LIBERTAD  donepezil, 10 mg, oral, Daily  famotidine, 10 mg, oral, q24h LIBERTAD  ferrous sulfate (325 mg ferrous sulfate), 65 mg of iron, oral, BID  folic acid, 1 mg, oral, Daily  gabapentin, 300 mg, oral, Daily  insulin lispro, 4 Units, subcutaneous, Once  melatonin, 9 mg, oral, Nightly  memantine, 10 mg, oral, q12h LIBERTAD  montelukast, 10 mg, oral, Nightly  nystatin, 5 mL, Swish & Swallow, 4x daily  polyethylene glycol, 17 g, oral, BID  potassium chloride, 20 mEq, intravenous, q2h  sertraline, 125 mg, oral, q AM  sodium hypochlorite, , irrigation, Daily       PRN medications: bisacodyl, bisacodyl, hydrOXYzine HCL, morphine, morphine, traZODone     Assessment/Plan     1.  Dementia, nonverbal, bedbound   -Patient is mostly at her baseline although family saying since August 7 she has been unable to get out of bed prior to that she was  getting up into the chair.  -Likely will go the hospice route but deciding if patient will go to a facility for hospice versus GIP  -IV morphine for pain changed frequency to every 3 hours     2.  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  Hypernatremia  -resolved decrease iv dextrose     4.  Acute kidney injury  -resolved    5. NSTEMI type II  -hold off on further work up    6. Hypokalemia  -repleted    Updated family at bedside  DVT Prophylaxis:  LASHAY Jacob MD  [facility]

## 2024-09-01 VITALS
HEIGHT: 64 IN | BODY MASS INDEX: 29.02 KG/M2 | DIASTOLIC BLOOD PRESSURE: 63 MMHG | HEART RATE: 65 BPM | OXYGEN SATURATION: 99 % | TEMPERATURE: 98.3 F | SYSTOLIC BLOOD PRESSURE: 103 MMHG | WEIGHT: 170 LBS | RESPIRATION RATE: 18 BRPM

## 2024-09-01 LAB
ANION GAP SERPL CALC-SCNC: 8 MMOL/L (ref 10–20)
BACTERIA BLD CULT: NORMAL
BACTERIA BLD CULT: NORMAL
BUN SERPL-MCNC: 12 MG/DL (ref 6–23)
CALCIUM SERPL-MCNC: 7.7 MG/DL (ref 8.6–10.3)
CHLORIDE SERPL-SCNC: 110 MMOL/L (ref 98–107)
CO2 SERPL-SCNC: 30 MMOL/L (ref 21–32)
CREAT SERPL-MCNC: 0.58 MG/DL (ref 0.5–1.05)
EGFRCR SERPLBLD CKD-EPI 2021: >90 ML/MIN/1.73M*2
GLUCOSE BLD MANUAL STRIP-MCNC: 85 MG/DL (ref 74–99)
GLUCOSE SERPL-MCNC: 90 MG/DL (ref 74–99)
HOLD SPECIMEN: NORMAL
MAGNESIUM SERPL-MCNC: 1.4 MG/DL (ref 1.6–2.4)
POTASSIUM SERPL-SCNC: 3.5 MMOL/L (ref 3.5–5.3)
SODIUM SERPL-SCNC: 144 MMOL/L (ref 136–145)

## 2024-09-01 PROCEDURE — 2500000001 HC RX 250 WO HCPCS SELF ADMINISTERED DRUGS (ALT 637 FOR MEDICARE OP): Performed by: PHARMACIST

## 2024-09-01 PROCEDURE — 36415 COLL VENOUS BLD VENIPUNCTURE: CPT | Performed by: INTERNAL MEDICINE

## 2024-09-01 PROCEDURE — 2500000001 HC RX 250 WO HCPCS SELF ADMINISTERED DRUGS (ALT 637 FOR MEDICARE OP): Performed by: INTERNAL MEDICINE

## 2024-09-01 PROCEDURE — 2500000002 HC RX 250 W HCPCS SELF ADMINISTERED DRUGS (ALT 637 FOR MEDICARE OP, ALT 636 FOR OP/ED): Performed by: HOSPITALIST

## 2024-09-01 PROCEDURE — 80048 BASIC METABOLIC PNL TOTAL CA: CPT | Performed by: INTERNAL MEDICINE

## 2024-09-01 PROCEDURE — 2500000004 HC RX 250 GENERAL PHARMACY W/ HCPCS (ALT 636 FOR OP/ED): Performed by: INTERNAL MEDICINE

## 2024-09-01 PROCEDURE — 2500000001 HC RX 250 WO HCPCS SELF ADMINISTERED DRUGS (ALT 637 FOR MEDICARE OP): Performed by: HOSPITALIST

## 2024-09-01 PROCEDURE — 99238 HOSP IP/OBS DSCHRG MGMT 30/<: CPT | Performed by: INTERNAL MEDICINE

## 2024-09-01 PROCEDURE — 82947 ASSAY GLUCOSE BLOOD QUANT: CPT

## 2024-09-01 RX ORDER — MORPHINE SULFATE 20 MG/ML
20 SOLUTION ORAL
Status: DISCONTINUED | OUTPATIENT
Start: 2024-09-01 | End: 2024-09-01

## 2024-09-01 RX ORDER — MORPHINE SULFATE 20 MG/ML
20 SOLUTION ORAL EVERY 4 HOURS PRN
Status: DISCONTINUED | OUTPATIENT
Start: 2024-09-01 | End: 2024-09-01

## 2024-09-01 RX ORDER — MORPHINE SULFATE 20 MG/ML
20 SOLUTION ORAL EVERY 4 HOURS PRN
Status: DISCONTINUED | OUTPATIENT
Start: 2024-09-01 | End: 2024-09-01 | Stop reason: HOSPADM

## 2024-09-01 RX ORDER — MORPHINE SULFATE 2 MG/ML
2 INJECTION, SOLUTION INTRAMUSCULAR; INTRAVENOUS
Status: DISCONTINUED | OUTPATIENT
Start: 2024-09-01 | End: 2024-09-01

## 2024-09-01 NOTE — NURSING NOTE
RN Hospice Note    Pt assessed for transfer to hospice [facility]. Report given to supervisor at hospice unit and pt accepted into room 132. [facility] to pickup pt at 2:30pm. Pt's dtr Bridger notified of room number and ambulance pickup time.  Please call [facility]ice team at [phone] if there are any questions/concerns or if pt expires.    Thank you,  Abby Mondragon

## 2024-09-01 NOTE — PROGRESS NOTES
"Elida Perry is a 79 y.o. female on day 3 of admission presenting with Acute metabolic encephalopathy.    Subjective   Per daughter family meeting again with palliative care today which is part of the hospice company that they are in touch with.  Patient is comfortable yesterday patient had a good day.  Has a IV again will continue with IV morphine.       Objective     Physical Exam  Vitals reviewed.   Constitutional:       Comments: Somonlent     HENT:      Head: Normocephalic.      Nose: Nose normal.      Mouth/Throat:      Mouth: Mucous membranes are moist.   Cardiovascular:      Rate and Rhythm: Normal rate and regular rhythm.   Pulmonary:      Effort: Pulmonary effort is normal.   Abdominal:      General: Abdomen is flat. Bowel sounds are normal.      Palpations: Abdomen is soft.   Skin:     Capillary Refill: Capillary refill takes less than 2 seconds.   Neurological:      General: No focal deficit present.         Last Recorded Vitals  Blood pressure 105/63, pulse 62, temperature 36.7 °C (98.1 °F), temperature source Axillary, resp. rate 17, height 1.626 m (5' 4\"), weight 77.1 kg (170 lb), SpO2 100%.  Intake/Output last 3 Shifts:  I/O last 3 completed shifts:  In: - (0 mL/kg)   Out: 500 (6.5 mL/kg) [Urine:500 (0.2 mL/kg/hr)]  Weight: 77.1 kg     Relevant Results  Results for orders placed or performed during the hospital encounter of 08/28/24 (from the past 24 hour(s))   POCT GLUCOSE   Result Value Ref Range    POCT Glucose 102 (H) 74 - 99 mg/dL   Basic Metabolic Panel   Result Value Ref Range    Glucose 90 74 - 99 mg/dL    Sodium 144 136 - 145 mmol/L    Potassium 3.5 3.5 - 5.3 mmol/L    Chloride 110 (H) 98 - 107 mmol/L    Bicarbonate 30 21 - 32 mmol/L    Anion Gap 8 (L) 10 - 20 mmol/L    Urea Nitrogen 12 6 - 23 mg/dL    Creatinine 0.58 0.50 - 1.05 mg/dL    eGFR >90 >60 mL/min/1.73m*2    Calcium 7.7 (L) 8.6 - 10.3 mg/dL   Green Top   Result Value Ref Range    Extra Tube Hold for add-ons.    POCT GLUCOSE "   Result Value Ref Range    POCT Glucose 85 74 - 99 mg/dL       Imaging Results    Ct abd/pelvis:      IMPRESSION:  Nonunion of a right subcapital hip fracture which may be acute or  subacute. Age-indeterminate cortical mild set along the posterior  acetabulum. Clinical correlation suggested.      Sacral decubitus ulcer with minimal indeterminate osseous  irregularity of the adjacent coccyx. Correlation for osteomyelitis  suggested.      Significant stool burden through the colon and rectum consistent with  constipation. Impaction not excluded.     Ct chest:      IMPRESSION:  No acute pulmonary embolism or other acute cardiopulmonary process.      Coronary artery calcifications.      Head ct:  IMPRESSION:  Significantly limited evaluation due to motion artifact. Within  constraints of imaging there is severe nonspecific white matter  changes and parenchymal volume loss, likely progressed from prior  imaging. No definite midline shift or significant acute intracranial  hemorrhage identified.        Medications:  amLODIPine, 5 mg, oral, Daily  ampicillin-sulbactam, 3 g, intravenous, q6h  apixaban, 5 mg, oral, q12h  atorvastatin, 40 mg, oral, q24h  bisacodyl, 10 mg, rectal, Once  calcium carbonate-vitamin D3, 1 tablet, oral, q12h LIBERTAD  donepezil, 10 mg, oral, Daily  famotidine, 10 mg, oral, q24h LIBERTAD  ferrous sulfate (325 mg ferrous sulfate), 65 mg of iron, oral, BID  folic acid, 1 mg, oral, Daily  gabapentin, 300 mg, oral, Daily  melatonin, 9 mg, oral, Nightly  memantine, 10 mg, oral, q12h LIBERTAD  montelukast, 10 mg, oral, Nightly  nystatin, 5 mL, Swish & Swallow, 4x daily  polyethylene glycol, 17 g, oral, BID  sertraline, 125 mg, oral, q AM  sodium hypochlorite, , irrigation, Daily       PRN medications: bisacodyl, bisacodyl, hydrOXYzine HCL, morphine, traZODone     Assessment/Plan     1.  Dementia, nonverbal, bedbound   -Patient is mostly at her baseline although family saying since August 7 she has been unable to get  out of bed prior to that she was getting up into the chair.  -Likely will go the hospice route but deciding if patient will go to a facility for hospice versus [facility]IP  -IV morphine for pain      2.  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  Hypernatremia  -resolved decrease iv dextrose     4.  Acute kidney injury  -resolved    5. NSTEMI type II  -hold off on further work up    6. Hypokalemia  -repleted    Updated family at bedside; meeting with pallative today  DVT Prophylaxis:  LASHAY Jacob MD  [facility]

## 2024-09-01 NOTE — PROGRESS NOTES
09/01/24 0853   Current Planned Discharge Disposition   Current Planned Discharge Disposition [facility]ospice[facility]     Per notes once med ready family is deciding on Hospice home vs [facility] Hospice, per notes in careport from [facility] they have a phone meeting scheduled today at 9:30, I will continue to monitor for discharge planning.

## 2024-09-02 LAB
BACTERIA BLD CULT: NORMAL
BACTERIA BLD CULT: NORMAL

## 2024-09-02 NOTE — DISCHARGE SUMMARY
Discharge Diagnosis  Acute metabolic encephalopathy    Issues Requiring Follow-Up  hospice    Discharge Meds     Your medication list        ASK your doctor about these medications        Instructions Last Dose Given Next Dose Due   amLODIPine 5 mg tablet  Commonly known as: Norvasc           apixaban 5 mg tablet  Commonly known as: Eliquis           atorvastatin 40 mg tablet  Commonly known as: Lipitor           balsam peru-castor oiL ointment  Commonly known as: Venelex           bisacodyl 10 mg suppository  Commonly known as: Dulcolax           calcium carbonate-vitamin D3 500 mg-5 mcg (200 unit) tablet           clobetasol 0.05 % cream  Commonly known as: Temovate           diclofenac sodium 1 % gel  Commonly known as: Voltaren           donepezil 10 mg tablet  Commonly known as: Aricept           famotidine 10 mg tablet  Commonly known as: Pepcid           FeroSuL tablet  Generic drug: ferrous sulfate (325 mg ferrous sulfate)           Floranex 1 million cell tablet tablet  Generic drug: lactobacillus acidophilus           folic acid 1 mg tablet  Commonly known as: Folvite           gabapentin 300 mg capsule  Commonly known as: Neurontin           hydrOXYzine HCL 25 mg tablet  Commonly known as: Atarax           magnesium hydroxide 400 mg/5 mL suspension  Commonly known as: Milk of Magnesia           melatonin 10 mg tablet           memantine 10 mg tablet  Commonly known as: Namenda           menthol-zinc ox-aloe-kirstin oil 0.45-20 % ointment           montelukast 10 mg tablet  Commonly known as: Singulair           sertraline 100 mg tablet  Commonly known as: Zoloft           sertraline 25 mg tablet  Commonly known as: Zoloft           traMADol 50 mg tablet  Commonly known as: Ultram           traZODone 50 mg tablet  Commonly known as: Desyrel           TylenoL 325 mg tablet  Generic drug: acetaminophen           vancomycin 50 mg/mL oral solution  Commonly known as: Firvanq                    Test Results  Pending At Discharge  Pending Labs       No current pending labs.            Hospital Course   Patient is a 79-year-old female with advanced dementia.  Met with family she did have suspected coccygeal osteomyelitis.  Patient's family signed with hospice therefore she was discharged on September 1.    Pertinent Physical Exam At Time of Discharge  Physical Exam    Outpatient Follow-Up  No future appointments.      Luda Jacob MD